# Patient Record
Sex: FEMALE | Race: WHITE | Employment: UNEMPLOYED | URBAN - METROPOLITAN AREA
[De-identification: names, ages, dates, MRNs, and addresses within clinical notes are randomized per-mention and may not be internally consistent; named-entity substitution may affect disease eponyms.]

---

## 2020-07-06 ENCOUNTER — APPOINTMENT (OUTPATIENT)
Dept: CT IMAGING | Age: 85
End: 2020-07-06
Payer: MEDICARE

## 2020-07-06 ENCOUNTER — APPOINTMENT (OUTPATIENT)
Dept: GENERAL RADIOLOGY | Age: 85
End: 2020-07-06
Payer: MEDICARE

## 2020-07-06 ENCOUNTER — HOSPITAL ENCOUNTER (INPATIENT)
Age: 85
LOS: 3 days | Discharge: HOME HEALTH | DRG: 481 | End: 2020-07-09
Attending: INTERNAL MEDICINE | Admitting: INTERNAL MEDICINE
Payer: MEDICARE

## 2020-07-06 ENCOUNTER — HOSPITAL ENCOUNTER (EMERGENCY)
Age: 85
Discharge: ANOTHER HOSPITAL | End: 2020-07-06
Attending: EMERGENCY MEDICINE
Payer: MEDICARE

## 2020-07-06 VITALS
OXYGEN SATURATION: 95 % | RESPIRATION RATE: 18 BRPM | DIASTOLIC BLOOD PRESSURE: 63 MMHG | BODY MASS INDEX: 22.36 KG/M2 | SYSTOLIC BLOOD PRESSURE: 150 MMHG | HEIGHT: 64 IN | WEIGHT: 131 LBS | HEART RATE: 73 BPM | TEMPERATURE: 98.8 F

## 2020-07-06 PROBLEM — I25.10 CAD (CORONARY ARTERY DISEASE): Status: ACTIVE | Noted: 2020-07-06

## 2020-07-06 PROBLEM — I10 HTN (HYPERTENSION): Status: ACTIVE | Noted: 2020-07-06

## 2020-07-06 PROBLEM — S72.002A CLOSED LEFT HIP FRACTURE, INITIAL ENCOUNTER (HCC): Status: ACTIVE | Noted: 2020-07-06

## 2020-07-06 LAB
ANION GAP SERPL CALCULATED.3IONS-SCNC: 10 MMOL/L (ref 7–16)
APTT: 34 SEC (ref 24.5–35.1)
BACTERIA: ABNORMAL /HPF
BASOPHILS ABSOLUTE: 0.03 E9/L (ref 0–0.2)
BASOPHILS RELATIVE PERCENT: 0.3 % (ref 0–2)
BILIRUBIN URINE: NEGATIVE
BLOOD, URINE: ABNORMAL
BUN BLDV-MCNC: 14 MG/DL (ref 8–23)
CALCIUM SERPL-MCNC: 9 MG/DL (ref 8.6–10.2)
CHLORIDE BLD-SCNC: 97 MMOL/L (ref 98–107)
CLARITY: CLEAR
CO2: 27 MMOL/L (ref 22–29)
COLOR: YELLOW
CREAT SERPL-MCNC: 1 MG/DL (ref 0.5–1)
EOSINOPHILS ABSOLUTE: 0.1 E9/L (ref 0.05–0.5)
EOSINOPHILS RELATIVE PERCENT: 1 % (ref 0–6)
EPITHELIAL CELLS, UA: ABNORMAL /HPF
GFR AFRICAN AMERICAN: >60
GFR NON-AFRICAN AMERICAN: 51 ML/MIN/1.73
GLUCOSE BLD-MCNC: 115 MG/DL (ref 74–99)
GLUCOSE URINE: NEGATIVE MG/DL
HCT VFR BLD CALC: 34.8 % (ref 34–48)
HEMOGLOBIN: 11.7 G/DL (ref 11.5–15.5)
IMMATURE GRANULOCYTES #: 0.04 E9/L
IMMATURE GRANULOCYTES %: 0.4 % (ref 0–5)
INR BLD: 1.2
KETONES, URINE: NEGATIVE MG/DL
LEUKOCYTE ESTERASE, URINE: NEGATIVE
LYMPHOCYTES ABSOLUTE: 1.31 E9/L (ref 1.5–4)
LYMPHOCYTES RELATIVE PERCENT: 13.5 % (ref 20–42)
MCH RBC QN AUTO: 31.2 PG (ref 26–35)
MCHC RBC AUTO-ENTMCNC: 33.6 % (ref 32–34.5)
MCV RBC AUTO: 92.8 FL (ref 80–99.9)
MONOCYTES ABSOLUTE: 0.82 E9/L (ref 0.1–0.95)
MONOCYTES RELATIVE PERCENT: 8.5 % (ref 2–12)
NEUTROPHILS ABSOLUTE: 7.39 E9/L (ref 1.8–7.3)
NEUTROPHILS RELATIVE PERCENT: 76.3 % (ref 43–80)
NITRITE, URINE: NEGATIVE
PDW BLD-RTO: 14.2 FL (ref 11.5–15)
PH UA: 6 (ref 5–9)
PLATELET # BLD: 259 E9/L (ref 130–450)
PMV BLD AUTO: 8.9 FL (ref 7–12)
POTASSIUM REFLEX MAGNESIUM: 3.6 MMOL/L (ref 3.5–5)
PROTEIN UA: 30 MG/DL
PROTHROMBIN TIME: 12.9 SEC (ref 9.3–12.4)
RBC # BLD: 3.75 E12/L (ref 3.5–5.5)
RBC UA: ABNORMAL /HPF (ref 0–2)
SODIUM BLD-SCNC: 134 MMOL/L (ref 132–146)
SPECIFIC GRAVITY UA: 1.02 (ref 1–1.03)
UROBILINOGEN, URINE: 1 E.U./DL
WBC # BLD: 9.7 E9/L (ref 4.5–11.5)
WBC UA: ABNORMAL /HPF (ref 0–5)

## 2020-07-06 PROCEDURE — 85610 PROTHROMBIN TIME: CPT

## 2020-07-06 PROCEDURE — 85730 THROMBOPLASTIN TIME PARTIAL: CPT

## 2020-07-06 PROCEDURE — 71045 X-RAY EXAM CHEST 1 VIEW: CPT

## 2020-07-06 PROCEDURE — APPSS45 APP SPLIT SHARED TIME 31-45 MINUTES: Performed by: NURSE PRACTITIONER

## 2020-07-06 PROCEDURE — 99222 1ST HOSP IP/OBS MODERATE 55: CPT | Performed by: INTERNAL MEDICINE

## 2020-07-06 PROCEDURE — 2580000003 HC RX 258: Performed by: NURSE PRACTITIONER

## 2020-07-06 PROCEDURE — 85025 COMPLETE CBC W/AUTO DIFF WBC: CPT

## 2020-07-06 PROCEDURE — 93005 ELECTROCARDIOGRAM TRACING: CPT | Performed by: NURSE PRACTITIONER

## 2020-07-06 PROCEDURE — 72192 CT PELVIS W/O DYE: CPT

## 2020-07-06 PROCEDURE — 81001 URINALYSIS AUTO W/SCOPE: CPT

## 2020-07-06 PROCEDURE — 6360000002 HC RX W HCPCS: Performed by: NURSE PRACTITIONER

## 2020-07-06 PROCEDURE — 6360000002 HC RX W HCPCS: Performed by: EMERGENCY MEDICINE

## 2020-07-06 PROCEDURE — 99285 EMERGENCY DEPT VISIT HI MDM: CPT

## 2020-07-06 PROCEDURE — 36415 COLL VENOUS BLD VENIPUNCTURE: CPT

## 2020-07-06 PROCEDURE — 2060000000 HC ICU INTERMEDIATE R&B

## 2020-07-06 PROCEDURE — 96374 THER/PROPH/DIAG INJ IV PUSH: CPT

## 2020-07-06 PROCEDURE — 70450 CT HEAD/BRAIN W/O DYE: CPT

## 2020-07-06 PROCEDURE — 80048 BASIC METABOLIC PNL TOTAL CA: CPT

## 2020-07-06 PROCEDURE — 6370000000 HC RX 637 (ALT 250 FOR IP): Performed by: NURSE PRACTITIONER

## 2020-07-06 PROCEDURE — 73502 X-RAY EXAM HIP UNI 2-3 VIEWS: CPT

## 2020-07-06 PROCEDURE — 72125 CT NECK SPINE W/O DYE: CPT

## 2020-07-06 RX ORDER — HYDROCODONE BITARTRATE AND ACETAMINOPHEN 5; 325 MG/1; MG/1
1 TABLET ORAL EVERY 6 HOURS PRN
Status: DISCONTINUED | OUTPATIENT
Start: 2020-07-06 | End: 2020-07-07

## 2020-07-06 RX ORDER — CHOLECALCIFEROL (VITAMIN D3) 1250 MCG
CAPSULE ORAL
COMMUNITY

## 2020-07-06 RX ORDER — MORPHINE SULFATE 2 MG/ML
2 INJECTION, SOLUTION INTRAMUSCULAR; INTRAVENOUS ONCE
Status: COMPLETED | OUTPATIENT
Start: 2020-07-06 | End: 2020-07-06

## 2020-07-06 RX ORDER — ENALAPRIL MALEATE 10 MG/1
10 TABLET ORAL DAILY
Status: DISCONTINUED | OUTPATIENT
Start: 2020-07-07 | End: 2020-07-07

## 2020-07-06 RX ORDER — POLYETHYLENE GLYCOL 3350 17 G/17G
17 POWDER, FOR SOLUTION ORAL DAILY PRN
Status: DISCONTINUED | OUTPATIENT
Start: 2020-07-06 | End: 2020-07-07

## 2020-07-06 RX ORDER — CLONIDINE HYDROCHLORIDE 0.2 MG/1
0.2 TABLET ORAL 2 TIMES DAILY
COMMUNITY

## 2020-07-06 RX ORDER — CARVEDILOL 25 MG/1
25 TABLET ORAL 2 TIMES DAILY WITH MEALS
Status: DISCONTINUED | OUTPATIENT
Start: 2020-07-06 | End: 2020-07-07

## 2020-07-06 RX ORDER — ONDANSETRON 2 MG/ML
4 INJECTION INTRAMUSCULAR; INTRAVENOUS EVERY 6 HOURS PRN
Status: DISCONTINUED | OUTPATIENT
Start: 2020-07-06 | End: 2020-07-07

## 2020-07-06 RX ORDER — ACETAMINOPHEN 650 MG/1
650 SUPPOSITORY RECTAL EVERY 6 HOURS PRN
Status: DISCONTINUED | OUTPATIENT
Start: 2020-07-06 | End: 2020-07-07

## 2020-07-06 RX ORDER — CITALOPRAM 20 MG/1
20 TABLET ORAL NIGHTLY
COMMUNITY

## 2020-07-06 RX ORDER — CLONIDINE HYDROCHLORIDE 0.2 MG/1
0.2 TABLET ORAL 2 TIMES DAILY
Status: DISCONTINUED | OUTPATIENT
Start: 2020-07-06 | End: 2020-07-07

## 2020-07-06 RX ORDER — AMLODIPINE BESYLATE 5 MG/1
5 TABLET ORAL NIGHTLY
COMMUNITY

## 2020-07-06 RX ORDER — CARVEDILOL 25 MG/1
25 TABLET ORAL 2 TIMES DAILY WITH MEALS
COMMUNITY

## 2020-07-06 RX ORDER — ACETAMINOPHEN 325 MG/1
650 TABLET ORAL EVERY 6 HOURS PRN
Status: DISCONTINUED | OUTPATIENT
Start: 2020-07-06 | End: 2020-07-07

## 2020-07-06 RX ORDER — ENALAPRIL MALEATE 10 MG/1
10 TABLET ORAL DAILY
COMMUNITY

## 2020-07-06 RX ORDER — SODIUM CHLORIDE 0.9 % (FLUSH) 0.9 %
10 SYRINGE (ML) INJECTION EVERY 12 HOURS SCHEDULED
Status: DISCONTINUED | OUTPATIENT
Start: 2020-07-06 | End: 2020-07-07

## 2020-07-06 RX ORDER — SODIUM CHLORIDE 0.9 % (FLUSH) 0.9 %
10 SYRINGE (ML) INJECTION PRN
Status: DISCONTINUED | OUTPATIENT
Start: 2020-07-06 | End: 2020-07-07

## 2020-07-06 RX ORDER — CITALOPRAM 20 MG/1
20 TABLET ORAL NIGHTLY
Status: DISCONTINUED | OUTPATIENT
Start: 2020-07-06 | End: 2020-07-07

## 2020-07-06 RX ORDER — PROMETHAZINE HYDROCHLORIDE 25 MG/1
12.5 TABLET ORAL EVERY 6 HOURS PRN
Status: DISCONTINUED | OUTPATIENT
Start: 2020-07-06 | End: 2020-07-07

## 2020-07-06 RX ORDER — AMLODIPINE BESYLATE 5 MG/1
5 TABLET ORAL NIGHTLY
Status: DISCONTINUED | OUTPATIENT
Start: 2020-07-06 | End: 2020-07-07

## 2020-07-06 RX ADMIN — CITALOPRAM HYDROBROMIDE 20 MG: 20 TABLET ORAL at 20:37

## 2020-07-06 RX ADMIN — SODIUM CHLORIDE, PRESERVATIVE FREE 10 ML: 5 INJECTION INTRAVENOUS at 20:37

## 2020-07-06 RX ADMIN — MORPHINE SULFATE 2 MG: 2 INJECTION, SOLUTION INTRAMUSCULAR; INTRAVENOUS at 11:53

## 2020-07-06 RX ADMIN — CLONIDINE HYDROCHLORIDE 0.2 MG: 0.2 TABLET ORAL at 20:37

## 2020-07-06 RX ADMIN — CARVEDILOL 25 MG: 25 TABLET, FILM COATED ORAL at 17:42

## 2020-07-06 RX ADMIN — AMLODIPINE BESYLATE 5 MG: 5 TABLET ORAL at 20:37

## 2020-07-06 ASSESSMENT — PAIN SCALES - GENERAL
PAINLEVEL_OUTOF10: 8
PAINLEVEL_OUTOF10: 0
PAINLEVEL_OUTOF10: 8

## 2020-07-06 ASSESSMENT — PAIN DESCRIPTION - ORIENTATION: ORIENTATION: LEFT

## 2020-07-06 ASSESSMENT — PAIN DESCRIPTION - LOCATION: LOCATION: HIP

## 2020-07-06 ASSESSMENT — PAIN DESCRIPTION - DESCRIPTORS: DESCRIPTORS: ACHING;SHARP

## 2020-07-06 NOTE — H&P
[facility]   History and Physical      CHIEF COMPLAINT:  Fall while at home     History of Present Illness: Shukri Echavarria is a 80 y.o. female with a history of CAD and HTN, who presents with fall while at home. Patient states that she was trying to walk to the bathroom when her house slipper got stuck on the rug and she fell. Patient states that she turned to walk, her body durned but her foot did not move. Patient states that she fell and hit her head but did not lose consciousness. Patient states that this happened yesterday. Patient is accompanied by her daughter who stated patient is an DNR but awaiting papers to verify. Patient denies any tobacco, alcohol or illicit drug use. Patient lives at home with . She rates her pain level at 5/10. Informant(s) for H&P:Provided by patient     REVIEW OF SYSTEMS:  no fevers, chills, cp, sob, n/v, ha, vision/hearing changes, wt changes, hot/cold flashes, other open skin lesions, diarrhea, constipation, dysuria/hematuria unless noted in HPI. Complete ROS performed with the patient and is otherwise negative. PMH:  Past Medical History:   Diagnosis Date    CAD (coronary artery disease)     Hypertension        Surgical History:  Past Surgical History:   Procedure Laterality Date    CHOLECYSTECTOMY      FRACTURE SURGERY         Medications Prior to Admission:    Prior to Admission medications    Medication Sig Start Date End Date Taking?  Authorizing Provider   citalopram (CELEXA) 20 MG tablet Take 20 mg by mouth nightly    Yes Historical Provider, MD   amLODIPine (NORVASC) 5 MG tablet Take 5 mg by mouth nightly    Yes Historical Provider, MD   enalapril (VASOTEC) 10 MG tablet Take 10 mg by mouth daily   Yes Historical Provider, MD   carvedilol (COREG) 25 MG tablet Take 25 mg by mouth 2 times daily (with meals)   Yes Historical Provider, MD   cloNIDine (CATAPRES) 0.2 MG tablet Take 0.2 mg by mouth 2 times daily   Yes Historical Provider, MD   Cholecalciferol (VITAMIN D3) 1.25 MG (87151 UT) CAPS Take by mouth   Yes Historical Provider, MD       Allergies:    Patient has no known allergies. Social History:    reports that she has quit smoking. She has never used smokeless tobacco. She reports previous alcohol use. She reports that she does not use drugs. Family History:    History reviewed. No pertinent family history. PHYSICAL EXAM:  Vitals:  BP (!) 158/67   Pulse 72   Temp 99 °F (37.2 °C) (Oral)   Resp 18   SpO2 90%   General Appearance: alert and oriented to person, place and time, well-developed and well-nourished, in no acute distress, intermittently forgetful  Skin: warm and dry  Head: normocephalic and atraumatic  Eyes: pupils equal, round, and reactive to light and conjunctivae normal  ENT: hearing grossly normal bilaterally  Neck: neck supple and non tender without mass   Pulmonary/Chest: clear to auscultation bilaterally- no wheezes, rales or rhonchi, normal air movement, no respiratory distress  Cardiovascular: normal rate, regular rhythm and intact distal pulses  Abdomen: soft, non-tender, non-distended, normal bowel sounds, no masses or organomegaly  Extremities: no cyanosis, no clubbing and no edema  Musculoskeletal: joint tenderness present  left hip and pain elicited with movement of  left leg  Neurologic: no cranial nerve deficit and speech normal      LABS:  Recent Labs     07/06/20  1149      K 3.6   CL 97*   CO2 27   BUN 14   CREATININE 1.0   GLUCOSE 115*   CALCIUM 9.0       Recent Labs     07/06/20  1149   WBC 9.7   RBC 3.75   HGB 11.7   HCT 34.8   MCV 92.8   MCH 31.2   MCHC 33.6   RDW 14.2      MPV 8.9       No results for input(s): POCGLU in the last 72 hours.     PT/INR:    Lab Results   Component Value Date    PROTIME 12.9 07/06/2020    INR 1.2 07/06/2020     U/A:    Lab Results   Component Value Date    COLORU YELLOW 05/31/2011    PROTEINU NEGATIVE 05/31/2011    PHUR 6.0 05/31/2011    WBCUA 2-5 2011    RBCUA 1-3 2011    BACTERIA NONE 2011    CLARITYU CLEAR 2011    SPECGRAV 1.010 2011    LEUKOCYTESUR SMALL 2011    UROBILINOGEN 0.2 2011    BILIRUBINUR NEGATIVE 2011    BLOODU SMALL 2011    GLUCOSEU NEGATIVE 2011       Radiology: Ct Head Wo Contrast    Result Date: 2020  Patient MRN: [id number] : 1921 Age:  80 years Gender: Female Order Date: 2020 9:30 AM Exam: CT HEAD WO CONTRAST Number of Images: 559 views Indication:   fall with acute head trauma fall Comparison: None. TECHNIQUE: Region of study: Head. CT images acquired without intravenous contrast. One or more of these dose optimization techniques were utilized: Automated exposure control; mA and/or kV adjustment per patient size (includes targeted exams where dose is matched to clinical indication); or iterative reconstruction. FINDINGS: No mass, hemorrhage or midline shift. There is atrophy and mild probable chronic microvascular ischemic disease. Bony calvarium unremarkable. Atrophy and mild probable chronic microvascular ischemic disease. Ct Cervical Spine Wo Contrast    Result Date: 2020  Patient MRN:  [id number] : 1921 Age: 80 years Gender: Female Order Date:  2020 9:30 AM EXAM: CT CERVICAL SPINE WO CONTRAST NUMBER OF IMAGES:  457 INDICATION:  fall fall COMPARISON: None Multiple CT sections were obtained with sagittal and coronal MPR reconstructions. Technique: Low-dose CT  acquisition technique included one of following options; 1 . Automated exposure control, 2. Adjustment of MA and or KV according to patient's size or 3. Use of iterative reconstruction. There is significant motion identified on this study. There are bilateral thyroid masses seen There are severe degenerative changes of the spine present. There are severe degenerative changes of the facets.  .    Degenerative changes Significantly limited study secondary to motion Bilateral thyroid masses    Ct Pelvis Wo Contrast Additional Contrast? None    Result Date: 2020  Patient MRN:  [id number] : 1921 Age: 80 years Gender: Female Order Date:  2020 9:30 AM EXAM: CT PELVIS WO CONTRAST INDICATION:  fall, left hip pain. TECHNIQUE: Axial images from above the iliac crest to the pubic symphysis without any IV or oral contrast. Axial, sagittal, and coronal 2-D reconstructions in soft tissue and bone windows. Dose reduction technique with automated exposure control. Contrast is none. Dose is total .10 MG Y CM. COMPARISON: Report only from right hip x-ray 10/28/2003, images not available on PACS. FINDINGS:  There is an acute impacted subcapital left femoral neck fracture. Impaction along the lateral margin. A slight valgus angulation. The femoral head is showing slight clockwise rotation within the acetabulum, no dislocation. Fracture does not extend into the intertrochanteric region or proximal femoral shaft. There is a small hip joint effusion. Underlying degenerative changes. Small subchondral cysts along the posterior superior femoral head margin. Mild narrowing of the superior-lateral hip joint. Mild superior and posterior acetabular margin degeneration, with tiny subchondral cysts and mild sclerosis. No acetabular fracture. The pubic rami are intact. No evidence for sacral fracture. Mild bilateral sacroiliac joint degeneration. 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 The cement beyond the tip of the right femoral stem is not fully included. No obvious new right femoral fracture and no dislocation. Chronic fragmentation of the greater trochanter. Chronic large area of myositis and spurring off the posterior acetabular margin extending to the right greater trochanter. Degenerative changes in the visualized lumbar spine. At L3/4, there is disc space narrowing, vacuum degeneration and mild spurring, not fully included.  At L4/5, there is severe flattening of the disc space with moderate vacuum degeneration, mild marginal spurring and tiny subchondral cysts. Moderate facet degeneration, mild disc bulge and a moderate central stenosis. Mild neural foraminal stenosis. At L5/S1, there is severe flattening of the disc space with mild vacuum degeneration. Moderate right lateral spurs. Mild facet degeneration. Mild left neural foraminal stenosis. Moderate right and left anterior lateral spurs L5/S1. No intraperitoneal free fluid or pelvic hematomas. Urinary bladder was very distended at the time of exam without wall thickening or calculi. Small uterus and ovaries. Mild constipation without obstruction. Minimal sigmoid diverticulosis. Moderate calcination at the aortic bifurcation. Moderate stenosis proximal right common iliac artery. .     1. Acute impacted subcapital left femoral neck fracture without dislocation. 2. 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 3. Lumbar degeneration. Xr Chest Portable    Result Date: 2020  Patient MRN: [id number] : 1921 Age:  80 years Gender: Female Order Date: 2020 9:30 AM Exam: XR CHEST PORTABLE Number of Images: 1 view Indication:   fall fall Comparison: 2008 Findings: The heart is enlarged. The lung fields demonstrate evidence for airspace disease. The aorta is tortuous and ectatic. Tortuous ectatic aorta Cardiomegaly Airspace disease compatible with pneumonia, in the left upper lobe. Neoplasm could give this appearance. Xr Hip 2-3 Vw W Pelvis Left    Result Date: 2020  Patient MRN:  [id number] : 1921 Age: 80 years Gender: Female Order Date:  2020 1:15 PM EXAM: XR HIP 2-3 VW W PELVIS LEFT INDICATION:  fall, hip fx COMPARISON: CT pelvis 2020. FINDINGS:  3 views. AP pelvis. AP and frog-leg lateral view left hip. Again demonstrated is an acute impacted subcapital left femoral neck fracture. Impaction along the lateral fracture margin. Lateral rotation of the left femoral head without dislocation. Osteopenia.  No fracture of the intertrochanteric region or proximal left femoral shaft. Old right hip prosthesis. Old large area of myositis ossificans lateral right hip area within adjacent large spur off the inferior right iliac bone. This could significantly reduced motion at the right hip. Mild spurring off the lateral right iliac bone. Sequelae joints and pubic symphysis are normal. No obvious deformity of the pubic rami. Degenerative narrowing and mild spurring in the lower lumbar spine. Mild constipation. Urinary bladder is mildly distended. 1. Acute impacted subcapital left femoral neck fracture. 2. No dislocation. 3. Old right hip prosthesis. EKG: pending     ASSESSMENT:      Principal Problem:    Closed left hip fracture, initial encounter ([facility] [address]  Active Problems:    HTN (hypertension)    CAD (coronary artery disease)  Resolved Problems:    * No resolved hospital problems. *      PLAN:    1. Closed left Hip Fracture  - secondary to fall - admit telemetry - consult orthopedic surgery - IVF's - Norco for pain   2. Hypertension - blood pressure controlled with Norvasc and Catapres  3. Coronary Artery Disease - continue Coreg and Vasotec   4. Fall - while at home - PT/OT consult when okay with orthopedic surgeon    5. ? Pneumonia - diminished lungs asymptomatic - no cough or fever   6. Incidental finding Bilateral Thyroid Mass   7. Degenerative Bone Disease - most likely d/t normal age process     Code Status: DNR CC needs clarified   DVT prophylaxis: Lovenox       Electronically signed by JANICE Mcgovern CNP on 7/6/2020 at 4:26 PM      NOTE: This report was transcribed using voice recognition software. Every effort was made to ensure accuracy; however, inadvertent computerized transcription errors may be present.

## 2020-07-06 NOTE — CONSULTS
Consult Note    Chief complaint: Pain left hip    History: Patient is a 80-year-old female, who lives in her own home by herself. She states that she has been ambulatory using a walker within her home for about the last 6 months. Yesterday she went to go to the bathroom around 630 to 7 PM.  She was wearing slippers on her feet. She tripped over the slippers somehow and fell against the door to the bathroom. She slid down to the floor. She had pain in the left groin and trouble bearing weight so she got herself up to a chair and called for help with her family. She came to the emergency room at the [address] [place] [address] emergency room. There CT scan and x-rays revealed a valgus impacted left femoral neck fracture. The patient's daughter-in-law states that while they are preparing to come to the hospital, the patient was able to stand up from a chair and take a few steps although she did have some pain with doing that. Past medical history: Hypertension, history of right femoral neck fracture for which she had a right hip hemiarthroplasty some years ago, done by me. Medications: Vasotec, Norvasc, Coreg, Celexa, Catapres    Exam: Very pleasant 80-year-old female in no apparent distress. She is awake, alert and oriented x3. She is an excellent historian. Her daughter-in-law is in the room with her. Patient able to actively flex left hip with some discomfort in the left groin . Minimal discomfort with passive internal and external rotation left hip. No significant leg length discrepancy. Full range of motion left ankle and toes. Sensation intact to light touch first dorsal webspace, dorsal and plantar aspect of left foot. Foot is warm to touch. CT scan and x-ray: Valgus impacted left femoral neck fracture. Impression: Valgus impacted left femoral neck fracture.     Plan: Given the position of this fracture is being relatively stable, believe that we should treat this via open reduction, internal fixation with 3 cannulated screws. I explained to the patient and her daughter-in-law what that means. Our goal will be to have this fracture held in place by the 3 cannulated screws and allow her to get up and hopefully maintain 50% weightbearing. If there should be failure of fixation and collapse of the femoral head then our next move would be to consider partial hip replacement. 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 I discussed risks of surgery which include risk of infection, blood loss, damage to nerves, arteries and veins, DVT/PE and death. Both the patient and the daughter-in-law state that they understand the risks involved and are ready to proceed to surgery. Surgery is currently planned for approximately 5 to 5:30 PM tomorrow, 7/7/2020. The patient can have regular diet now up until midnight. After midnight until 7 AM she can have clear liquids, then n.p.o. after 7 AM.  We will proceed with surgery if the patient is cleared by medical service.     Electronically signed by Eli Pandya MD on 7/6/2020 at 6:08 PM

## 2020-07-06 NOTE — ED PROVIDER NOTES
HPI:  7/6/20, Time: 9:21 AM EDT         Angel Guthrie is a 80 y.o. female presenting to the ED for fall yesterday. Patient states that she slipped on the rug when she was going to use the bathroom causing her to fall. She struck her head. She did not lose consciousness. She was able to ambulate after the incident. She reports pain to her left hip which is worse with movement and walking today. She denies headache, neck pain, back pain, chest pain, abdominal pain, nausea, vomiting, and focal numbness or weakness. She takes no anticoagulation. She lives at home. Review of Systems:   Pertinent positives and negatives are stated within HPI, all other systems reviewed and are negative.          --------------------------------------------- PAST HISTORY ---------------------------------------------  Past Medical History:  has a past medical history of CAD (coronary artery disease) and Hypertension. Past Surgical History:  has a past surgical history that includes fracture surgery and Cholecystectomy. Social History:  reports that she has quit smoking. She has never used smokeless tobacco. She reports previous alcohol use. She reports that she does not use drugs. Family History: family history is not on file. The patients home medications have been reviewed. Allergies: Patient has no known allergies.     -------------------------------------------------- RESULTS -------------------------------------------------  All laboratory and radiology results have been personally reviewed by myself   LABS:  Results for orders placed or performed during the hospital encounter of 07/06/20   CBC Auto Differential   Result Value Ref Range    WBC 9.7 4.5 - 11.5 E9/L    RBC 3.75 3.50 - 5.50 E12/L    Hemoglobin 11.7 11.5 - 15.5 g/dL    Hematocrit 34.8 34.0 - 48.0 %    MCV 92.8 80.0 - 99.9 fL    MCH 31.2 26.0 - 35.0 pg    MCHC 33.6 32.0 - 34.5 %    RDW 14.2 11.5 - 15.0 fL    Platelets 915 624 - 393 E9/L    MPV 8.9 7.0 - 12.0 fL    Neutrophils % 76.3 43.0 - 80.0 %    Immature Granulocytes % 0.4 0.0 - 5.0 %    Lymphocytes % 13.5 (L) 20.0 - 42.0 %    Monocytes % 8.5 2.0 - 12.0 %    Eosinophils % 1.0 0.0 - 6.0 %    Basophils % 0.3 0.0 - 2.0 %    Neutrophils Absolute 7.39 (H) 1.80 - 7.30 E9/L    Immature Granulocytes # 0.04 E9/L    Lymphocytes Absolute 1.31 (L) 1.50 - 4.00 E9/L    Monocytes Absolute 0.82 0.10 - 0.95 E9/L    Eosinophils Absolute 0.10 0.05 - 0.50 E9/L    Basophils Absolute 0.03 0.00 - 0.20 A8/C   Basic Metabolic Panel w/ Reflex to MG   Result Value Ref Range    Sodium 134 132 - 146 mmol/L    Potassium reflex Magnesium 3.6 3.5 - 5.0 mmol/L    Chloride 97 (L) 98 - 107 mmol/L    CO2 27 22 - 29 mmol/L    Anion Gap 10 7 - 16 mmol/L    Glucose 115 (H) 74 - 99 mg/dL    BUN 14 8 - 23 mg/dL    CREATININE 1.0 0.5 - 1.0 mg/dL    GFR Non-African American 51 >=60 mL/min/1.73    GFR African American >60     Calcium 9.0 8.6 - 10.2 mg/dL   Protime-INR   Result Value Ref Range    Protime 12.9 (H) 9.3 - 12.4 sec    INR 1.2    APTT   Result Value Ref Range    aPTT 34.0 24.5 - 35.1 sec       RADIOLOGY:  Interpreted by Radiologist.  XR HIP 2-3 VW W PELVIS LEFT   Final Result      1. Acute impacted subcapital left femoral neck fracture. 2. No dislocation. 3. Old right hip prosthesis. CT PELVIS WO CONTRAST Additional Contrast? None   Final Result      1. Acute impacted subcapital left femoral neck fracture without   dislocation. 2. 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 3. Lumbar degeneration. XR CHEST PORTABLE   Final Result   Tortuous ectatic aorta   Cardiomegaly   Airspace disease compatible with pneumonia, in the left upper lobe. Neoplasm could give this appearance. CT Head WO Contrast   Final Result   Atrophy and mild probable chronic microvascular ischemic disease.       CT Cervical Spine WO Contrast   Final Result   Degenerative changes   Significantly limited study secondary to motion   Bilateral thyroid masses          ------------------------- NURSING NOTES AND VITALS REVIEWED ---------------------------   The nursing notes within the ED encounter and vital signs as below have been reviewed. BP (!) 150/63   Pulse 73   Temp 98.8 °F (37.1 °C) (Oral)   Resp 18   Ht 5' 4\" (1.626 m)   Wt 131 lb (59.4 kg)   SpO2 95%   BMI 22.49 kg/m²   Oxygen Saturation Interpretation: Normal      ---------------------------------------------------PHYSICAL EXAM--------------------------------------      PHYSICAL EXAM:  Vitals Reviewed  Constitutional/General: Alert and oriented x3, well appearing, non toxic in NAD. HEENT: Normocephalic and atraumatic. PERRL, EOMI. Oropharynx clear, handling secretions, no trismus. No hemotympanum. No raccoon eyes. No hickey's sign. Neck: Supple, full ROM, no cervical spine tenderness. Pulmonary: Lungs clear to auscultation bilaterally, no wheezes, rales, or rhonchi. Not in respiratory distress. Cardiovascular:  Regular rate and rhythm, no murmurs, gallops, or rubs. 2+ distal pulses. Chest: No chest wall tenderness. No crepitus. Abdomen: Soft, non tender, non distended,   Extremities: Moves all extremities x 4. Warm and well perfused. Tenderness to left groin and hip, normal range of motion, distal pulses intact. Back: No midline thoracic or lumbar tenderness. Skin: warm and dry without rash. Neurologic: GCS 15, 5/5 strength in all extremities. Normal sensation in all extremities. Psych: Normal Affect.      ------------------------------ ED COURSE/MEDICAL DECISION MAKING----------------------  Medications   morphine (PF) injection 2 mg (2 mg Intravenous Given 7/6/20 1153)       Medical Decision Making/ED COURSE:   Patient is a 60-year-old female presenting after mechanical fall yesterday with left hip pain. She was hemodynamically stable and nontoxic on evaluation.   Head CT, cervical spine CT, chest x-ray, and pelvic CT were ordered to

## 2020-07-06 NOTE — LETTER
Beneficiary Notification Letter  BPCI Advanced     Your Doctor or [address],    We wanted to let you know that your health care provider, [address] [place] [address] Renea, has volunteered to take part in our [facility] for [facility] & Medicaid Services (CMS) Bundled Payments for [facility] (BPCI Advanced). This doesnt change your Medicare rights or benefits and you dont need to do anything. What are bundled payments? A bundled payment combines, or bundles together, payments that Medicare makes to your health care providers for the many different kinds of medical services you might get in a specific time period. In BPCI Advanced, this time period could include a hospital inpatient stay or outpatient procedure, plus 90 days. Why would Medicare bundle payments? Bundled payments are thought of as a value-based way to pay because health care providers are responsible for both the quality and cost of medical care they give. This is a relatively new way of paying health care providers compared to thefee-for-service way Medicare has traditionally paid, where providers are paid separately for each service they provide. Bundled payments encourage these providers to work together to provide better, more coordinated care during your hospital stay, or outpatient procedure, and through your recovery. What does BPCI Advance mean for me? Youre more likely to get even better care when hospitals, doctors, and other health care providers work together. In BPCI Advanced, hospitals, doctors, and other health care providers may be rewarded for providing better, more coordinated health care. Medicare will watch BPCI Advanced participants closely to make sure that you and other patients keep getting efficient, high quality care. What do I need to know about BPCI Advanced? Whats most important for you to know is that your Medicare rights and benefits wont change because your health care provider is participating in [address] [place]. Medicare will keep covering all of your medically necessary services. Even though Medicare will pay your doctor in a different way under BPCI Advanced, how much you have to pay wont change. Health care providers and suppliers who are enrolled in Medicare will submit their Medicare claims like they always have. Youll have all the same Medicare rights and protections, including the right to choose which hospital, doctor, or other health care provider you see. If you dont want to get care from a health care provider whos participating in [address] [place], then youll have to choose a different health care provider whos not participating in the Model. How can I give feedback about my health care? Medicare might ask you to take a voluntary survey about the services and care you received from [address] [place] [address] during your hospital stay or outpatient procedure and for a specific period of time afterwards. You can decide whether you want to take the voluntary survey, but if you do, itll help Medicare make BPCI Advanced and the care of other Medicare patients better. If you have concerns or complaints about your care, you can:   · Talk to your doctor or health care provider. · Contact your Beneficiary and Family Centered Care Quality Improvement   Organization JORGE LUIS GRANADOS [facility]). You can get your BFCC-QIOs phone number  at  Medicare.gov/contacts or by calling 1-800-MEDICARE. TTY users can call  3-844.446.8133. Where can I learn more about BPCI Advanced? Learn more about BPCI Advanced at https://innovation.cms.gov/initiatives/bpci-advanced/:  · A list of all the hospitals and physician group practices in the country participating in [address] [place]. · All of the inpatient and outpatient Clinical Episodes that are currently included under BPCI Advanced. A Clinical Episode is a grouping of medical conditions or diagnoses that are included in the [address] Health system.

## 2020-07-07 ENCOUNTER — ANESTHESIA EVENT (OUTPATIENT)
Dept: OPERATING ROOM | Age: 85
DRG: 481 | End: 2020-07-07
Payer: MEDICARE

## 2020-07-07 ENCOUNTER — APPOINTMENT (OUTPATIENT)
Dept: GENERAL RADIOLOGY | Age: 85
DRG: 481 | End: 2020-07-07
Attending: INTERNAL MEDICINE
Payer: MEDICARE

## 2020-07-07 ENCOUNTER — ANESTHESIA (OUTPATIENT)
Dept: OPERATING ROOM | Age: 85
DRG: 481 | End: 2020-07-07
Payer: MEDICARE

## 2020-07-07 VITALS
RESPIRATION RATE: 14 BRPM | SYSTOLIC BLOOD PRESSURE: 128 MMHG | DIASTOLIC BLOOD PRESSURE: 53 MMHG | TEMPERATURE: 98.4 F | OXYGEN SATURATION: 76 %

## 2020-07-07 LAB
ABO/RH: NORMAL
ALBUMIN SERPL-MCNC: 3.4 G/DL (ref 3.5–5.2)
ALP BLD-CCNC: 74 U/L (ref 35–104)
ALT SERPL-CCNC: 29 U/L (ref 0–32)
ANION GAP SERPL CALCULATED.3IONS-SCNC: 11 MMOL/L (ref 7–16)
ANTIBODY SCREEN: NORMAL
AST SERPL-CCNC: 31 U/L (ref 0–31)
BASOPHILS ABSOLUTE: 0.03 E9/L (ref 0–0.2)
BASOPHILS RELATIVE PERCENT: 0.4 % (ref 0–2)
BILIRUB SERPL-MCNC: 0.7 MG/DL (ref 0–1.2)
BUN BLDV-MCNC: 14 MG/DL (ref 8–23)
CALCIUM SERPL-MCNC: 9 MG/DL (ref 8.6–10.2)
CHLORIDE BLD-SCNC: 95 MMOL/L (ref 98–107)
CO2: 28 MMOL/L (ref 22–29)
CREAT SERPL-MCNC: 0.9 MG/DL (ref 0.5–1)
EOSINOPHILS ABSOLUTE: 0.16 E9/L (ref 0.05–0.5)
EOSINOPHILS RELATIVE PERCENT: 2 % (ref 0–6)
GFR AFRICAN AMERICAN: >60
GFR NON-AFRICAN AMERICAN: 58 ML/MIN/1.73
GLUCOSE BLD-MCNC: 101 MG/DL (ref 74–99)
HCT VFR BLD CALC: 32.6 % (ref 34–48)
HEMOGLOBIN: 10.8 G/DL (ref 11.5–15.5)
IMMATURE GRANULOCYTES #: 0.03 E9/L
IMMATURE GRANULOCYTES %: 0.4 % (ref 0–5)
LV EF: 65 %
LVEF MODALITY: NORMAL
LYMPHOCYTES ABSOLUTE: 1.39 E9/L (ref 1.5–4)
LYMPHOCYTES RELATIVE PERCENT: 17 % (ref 20–42)
MAGNESIUM: 2.2 MG/DL (ref 1.6–2.6)
MCH RBC QN AUTO: 30.3 PG (ref 26–35)
MCHC RBC AUTO-ENTMCNC: 33.1 % (ref 32–34.5)
MCV RBC AUTO: 91.6 FL (ref 80–99.9)
MONOCYTES ABSOLUTE: 1.01 E9/L (ref 0.1–0.95)
MONOCYTES RELATIVE PERCENT: 12.3 % (ref 2–12)
NEUTROPHILS ABSOLUTE: 5.57 E9/L (ref 1.8–7.3)
NEUTROPHILS RELATIVE PERCENT: 67.9 % (ref 43–80)
PDW BLD-RTO: 14.1 FL (ref 11.5–15)
PHOSPHORUS: 2.8 MG/DL (ref 2.5–4.5)
PLATELET # BLD: 229 E9/L (ref 130–450)
PMV BLD AUTO: 8.8 FL (ref 7–12)
POTASSIUM REFLEX MAGNESIUM: 3.9 MMOL/L (ref 3.5–5)
RBC # BLD: 3.56 E12/L (ref 3.5–5.5)
SODIUM BLD-SCNC: 134 MMOL/L (ref 132–146)
TOTAL PROTEIN: 7.3 G/DL (ref 6.4–8.3)
WBC # BLD: 8.2 E9/L (ref 4.5–11.5)

## 2020-07-07 PROCEDURE — 7100000000 HC PACU RECOVERY - FIRST 15 MIN: Performed by: ORTHOPAEDIC SURGERY

## 2020-07-07 PROCEDURE — 6360000002 HC RX W HCPCS: Performed by: ORTHOPAEDIC SURGERY

## 2020-07-07 PROCEDURE — 3600000003 HC SURGERY LEVEL 3 BASE: Performed by: ORTHOPAEDIC SURGERY

## 2020-07-07 PROCEDURE — 2500000003 HC RX 250 WO HCPCS: Performed by: NURSE ANESTHETIST, CERTIFIED REGISTERED

## 2020-07-07 PROCEDURE — 86850 RBC ANTIBODY SCREEN: CPT

## 2020-07-07 PROCEDURE — APPSS30 APP SPLIT SHARED TIME 16-30 MINUTES: Performed by: NURSE PRACTITIONER

## 2020-07-07 PROCEDURE — 2700000000 HC OXYGEN THERAPY PER DAY

## 2020-07-07 PROCEDURE — 6360000002 HC RX W HCPCS: Performed by: NURSE ANESTHETIST, CERTIFIED REGISTERED

## 2020-07-07 PROCEDURE — 1200000000 HC SEMI PRIVATE

## 2020-07-07 PROCEDURE — 2580000003 HC RX 258: Performed by: ORTHOPAEDIC SURGERY

## 2020-07-07 PROCEDURE — 6370000000 HC RX 637 (ALT 250 FOR IP): Performed by: ORTHOPAEDIC SURGERY

## 2020-07-07 PROCEDURE — C1769 GUIDE WIRE: HCPCS | Performed by: ORTHOPAEDIC SURGERY

## 2020-07-07 PROCEDURE — 86900 BLOOD TYPING SEROLOGIC ABO: CPT

## 2020-07-07 PROCEDURE — 2709999900 HC NON-CHARGEABLE SUPPLY: Performed by: ORTHOPAEDIC SURGERY

## 2020-07-07 PROCEDURE — 87081 CULTURE SCREEN ONLY: CPT

## 2020-07-07 PROCEDURE — APPSS60 APP SPLIT SHARED TIME 46-60 MINUTES: Performed by: PHYSICIAN ASSISTANT

## 2020-07-07 PROCEDURE — 2580000003 HC RX 258: Performed by: NURSE ANESTHETIST, CERTIFIED REGISTERED

## 2020-07-07 PROCEDURE — 93306 TTE W/DOPPLER COMPLETE: CPT

## 2020-07-07 PROCEDURE — 2500000003 HC RX 250 WO HCPCS: Performed by: ORTHOPAEDIC SURGERY

## 2020-07-07 PROCEDURE — 3700000000 HC ANESTHESIA ATTENDED CARE: Performed by: ORTHOPAEDIC SURGERY

## 2020-07-07 PROCEDURE — 83735 ASSAY OF MAGNESIUM: CPT

## 2020-07-07 PROCEDURE — 6370000000 HC RX 637 (ALT 250 FOR IP): Performed by: NURSE PRACTITIONER

## 2020-07-07 PROCEDURE — 3700000001 HC ADD 15 MINUTES (ANESTHESIA): Performed by: ORTHOPAEDIC SURGERY

## 2020-07-07 PROCEDURE — 36415 COLL VENOUS BLD VENIPUNCTURE: CPT

## 2020-07-07 PROCEDURE — 2580000003 HC RX 258: Performed by: NURSE PRACTITIONER

## 2020-07-07 PROCEDURE — 0QS704Z REPOSITION LEFT UPPER FEMUR WITH INTERNAL FIXATION DEVICE, OPEN APPROACH: ICD-10-PCS | Performed by: ORTHOPAEDIC SURGERY

## 2020-07-07 PROCEDURE — 80053 COMPREHEN METABOLIC PANEL: CPT

## 2020-07-07 PROCEDURE — 99232 SBSQ HOSP IP/OBS MODERATE 35: CPT | Performed by: INTERNAL MEDICINE

## 2020-07-07 PROCEDURE — 3209999900 FLUORO FOR SURGICAL PROCEDURES

## 2020-07-07 PROCEDURE — 85025 COMPLETE CBC W/AUTO DIFF WBC: CPT

## 2020-07-07 PROCEDURE — 3600000013 HC SURGERY LEVEL 3 ADDTL 15MIN: Performed by: ORTHOPAEDIC SURGERY

## 2020-07-07 PROCEDURE — 84100 ASSAY OF PHOSPHORUS: CPT

## 2020-07-07 PROCEDURE — C1713 ANCHOR/SCREW BN/BN,TIS/BN: HCPCS | Performed by: ORTHOPAEDIC SURGERY

## 2020-07-07 PROCEDURE — 86901 BLOOD TYPING SEROLOGIC RH(D): CPT

## 2020-07-07 PROCEDURE — 7100000001 HC PACU RECOVERY - ADDTL 15 MIN: Performed by: ORTHOPAEDIC SURGERY

## 2020-07-07 PROCEDURE — 99223 1ST HOSP IP/OBS HIGH 75: CPT | Performed by: INTERNAL MEDICINE

## 2020-07-07 DEVICE — SCREW BNE L85MM DIA7.3MM THRD L16MM CANC S STL SELF DRL ST: Type: IMPLANTABLE DEVICE | Status: FUNCTIONAL

## 2020-07-07 DEVICE — SCREW BNE L90MM DIA7.3MM THRD L16MM CANC S STL SELF DRL ST: Type: IMPLANTABLE DEVICE | Status: FUNCTIONAL

## 2020-07-07 RX ORDER — ONDANSETRON 2 MG/ML
INJECTION INTRAMUSCULAR; INTRAVENOUS PRN
Status: DISCONTINUED | OUTPATIENT
Start: 2020-07-07 | End: 2020-07-07 | Stop reason: SDUPTHER

## 2020-07-07 RX ORDER — FENTANYL CITRATE 50 UG/ML
25 INJECTION, SOLUTION INTRAMUSCULAR; INTRAVENOUS EVERY 5 MIN PRN
Status: DISCONTINUED | OUTPATIENT
Start: 2020-07-07 | End: 2020-07-07

## 2020-07-07 RX ORDER — MEPERIDINE HYDROCHLORIDE 25 MG/ML
12.5 INJECTION INTRAMUSCULAR; INTRAVENOUS; SUBCUTANEOUS EVERY 5 MIN PRN
Status: DISCONTINUED | OUTPATIENT
Start: 2020-07-07 | End: 2020-07-07

## 2020-07-07 RX ORDER — FENTANYL CITRATE 50 UG/ML
50 INJECTION, SOLUTION INTRAMUSCULAR; INTRAVENOUS EVERY 5 MIN PRN
Status: DISCONTINUED | OUTPATIENT
Start: 2020-07-07 | End: 2020-07-07

## 2020-07-07 RX ORDER — ASPIRIN 81 MG/1
162 TABLET ORAL DAILY
Status: DISCONTINUED | OUTPATIENT
Start: 2020-07-07 | End: 2020-07-09 | Stop reason: HOSPADM

## 2020-07-07 RX ORDER — PROPOFOL 10 MG/ML
INJECTION, EMULSION INTRAVENOUS PRN
Status: DISCONTINUED | OUTPATIENT
Start: 2020-07-07 | End: 2020-07-07 | Stop reason: SDUPTHER

## 2020-07-07 RX ORDER — LIDOCAINE HYDROCHLORIDE 20 MG/ML
INJECTION, SOLUTION EPIDURAL; INFILTRATION; INTRACAUDAL; PERINEURAL PRN
Status: DISCONTINUED | OUTPATIENT
Start: 2020-07-07 | End: 2020-07-07 | Stop reason: SDUPTHER

## 2020-07-07 RX ORDER — ONDANSETRON 2 MG/ML
4 INJECTION INTRAMUSCULAR; INTRAVENOUS EVERY 6 HOURS PRN
Status: DISCONTINUED | OUTPATIENT
Start: 2020-07-07 | End: 2020-07-09 | Stop reason: HOSPADM

## 2020-07-07 RX ORDER — SODIUM CHLORIDE 0.9 % (FLUSH) 0.9 %
10 SYRINGE (ML) INJECTION PRN
Status: DISCONTINUED | OUTPATIENT
Start: 2020-07-07 | End: 2020-07-09 | Stop reason: HOSPADM

## 2020-07-07 RX ORDER — SODIUM CHLORIDE 9 MG/ML
INJECTION, SOLUTION INTRAVENOUS CONTINUOUS
Status: DISCONTINUED | OUTPATIENT
Start: 2020-07-07 | End: 2020-07-09 | Stop reason: HOSPADM

## 2020-07-07 RX ORDER — SODIUM CHLORIDE, SODIUM LACTATE, POTASSIUM CHLORIDE, CALCIUM CHLORIDE 600; 310; 30; 20 MG/100ML; MG/100ML; MG/100ML; MG/100ML
INJECTION, SOLUTION INTRAVENOUS CONTINUOUS PRN
Status: DISCONTINUED | OUTPATIENT
Start: 2020-07-07 | End: 2020-07-07 | Stop reason: SDUPTHER

## 2020-07-07 RX ORDER — OXYCODONE HYDROCHLORIDE AND ACETAMINOPHEN 5; 325 MG/1; MG/1
1 TABLET ORAL
Status: DISCONTINUED | OUTPATIENT
Start: 2020-07-07 | End: 2020-07-07

## 2020-07-07 RX ORDER — TRAMADOL HYDROCHLORIDE 50 MG/1
50 TABLET ORAL EVERY 6 HOURS PRN
Status: DISCONTINUED | OUTPATIENT
Start: 2020-07-07 | End: 2020-07-09 | Stop reason: HOSPADM

## 2020-07-07 RX ORDER — BUPIVACAINE HYDROCHLORIDE 5 MG/ML
INJECTION, SOLUTION EPIDURAL; INTRACAUDAL PRN
Status: DISCONTINUED | OUTPATIENT
Start: 2020-07-07 | End: 2020-07-07 | Stop reason: ALTCHOICE

## 2020-07-07 RX ORDER — SENNA AND DOCUSATE SODIUM 50; 8.6 MG/1; MG/1
1 TABLET, FILM COATED ORAL 2 TIMES DAILY
Status: DISCONTINUED | OUTPATIENT
Start: 2020-07-07 | End: 2020-07-09 | Stop reason: HOSPADM

## 2020-07-07 RX ORDER — ACETAMINOPHEN 325 MG/1
650 TABLET ORAL EVERY 6 HOURS PRN
Status: DISCONTINUED | OUTPATIENT
Start: 2020-07-07 | End: 2020-07-09 | Stop reason: HOSPADM

## 2020-07-07 RX ORDER — PROMETHAZINE HYDROCHLORIDE 25 MG/ML
6.25 INJECTION, SOLUTION INTRAMUSCULAR; INTRAVENOUS
Status: DISCONTINUED | OUTPATIENT
Start: 2020-07-07 | End: 2020-07-07

## 2020-07-07 RX ORDER — ROCURONIUM BROMIDE 10 MG/ML
INJECTION, SOLUTION INTRAVENOUS PRN
Status: DISCONTINUED | OUTPATIENT
Start: 2020-07-07 | End: 2020-07-07 | Stop reason: SDUPTHER

## 2020-07-07 RX ORDER — FENTANYL CITRATE 50 UG/ML
INJECTION, SOLUTION INTRAMUSCULAR; INTRAVENOUS PRN
Status: DISCONTINUED | OUTPATIENT
Start: 2020-07-07 | End: 2020-07-07 | Stop reason: SDUPTHER

## 2020-07-07 RX ORDER — PROMETHAZINE HYDROCHLORIDE 25 MG/1
12.5 TABLET ORAL EVERY 6 HOURS PRN
Status: DISCONTINUED | OUTPATIENT
Start: 2020-07-07 | End: 2020-07-09 | Stop reason: HOSPADM

## 2020-07-07 RX ORDER — SODIUM CHLORIDE 0.9 % (FLUSH) 0.9 %
10 SYRINGE (ML) INJECTION EVERY 12 HOURS SCHEDULED
Status: DISCONTINUED | OUTPATIENT
Start: 2020-07-07 | End: 2020-07-09 | Stop reason: HOSPADM

## 2020-07-07 RX ORDER — DIPHENHYDRAMINE HYDROCHLORIDE 50 MG/ML
12.5 INJECTION INTRAMUSCULAR; INTRAVENOUS
Status: DISCONTINUED | OUTPATIENT
Start: 2020-07-07 | End: 2020-07-07

## 2020-07-07 RX ADMIN — FENTANYL CITRATE 50 MCG: 50 INJECTION, SOLUTION INTRAMUSCULAR; INTRAVENOUS at 17:02

## 2020-07-07 RX ADMIN — LIDOCAINE HYDROCHLORIDE 60 MG: 20 INJECTION, SOLUTION EPIDURAL; INFILTRATION; INTRACAUDAL; PERINEURAL at 16:39

## 2020-07-07 RX ADMIN — PROPOFOL 100 MG: 10 INJECTION, EMULSION INTRAVENOUS at 16:39

## 2020-07-07 RX ADMIN — SUGAMMADEX 200 MG: 100 INJECTION, SOLUTION INTRAVENOUS at 17:58

## 2020-07-07 RX ADMIN — ROCURONIUM BROMIDE 30 MG: 10 INJECTION, SOLUTION INTRAVENOUS at 16:39

## 2020-07-07 RX ADMIN — DOCUSATE SODIUM 50 MG AND SENNOSIDES 8.6 MG 1 TABLET: 8.6; 5 TABLET, FILM COATED ORAL at 21:00

## 2020-07-07 RX ADMIN — CARVEDILOL 25 MG: 25 TABLET, FILM COATED ORAL at 10:06

## 2020-07-07 RX ADMIN — CEFAZOLIN 1 G: 1 INJECTION, POWDER, FOR SOLUTION INTRAMUSCULAR; INTRAVENOUS at 16:57

## 2020-07-07 RX ADMIN — SODIUM CHLORIDE: 9 INJECTION, SOLUTION INTRAVENOUS at 20:19

## 2020-07-07 RX ADMIN — SODIUM CHLORIDE, POTASSIUM CHLORIDE, SODIUM LACTATE AND CALCIUM CHLORIDE: 600; 310; 30; 20 INJECTION, SOLUTION INTRAVENOUS at 16:30

## 2020-07-07 RX ADMIN — TRAMADOL HYDROCHLORIDE 50 MG: 50 TABLET, FILM COATED ORAL at 22:43

## 2020-07-07 RX ADMIN — CLONIDINE HYDROCHLORIDE 0.2 MG: 0.2 TABLET ORAL at 14:35

## 2020-07-07 RX ADMIN — SODIUM CHLORIDE, PRESERVATIVE FREE 10 ML: 5 INJECTION INTRAVENOUS at 10:06

## 2020-07-07 RX ADMIN — FENTANYL CITRATE 50 MCG: 50 INJECTION, SOLUTION INTRAMUSCULAR; INTRAVENOUS at 16:39

## 2020-07-07 RX ADMIN — PROPOFOL 30 MG: 10 INJECTION, EMULSION INTRAVENOUS at 17:13

## 2020-07-07 RX ADMIN — ASPIRIN 162 MG: 81 TABLET, COATED ORAL at 22:43

## 2020-07-07 RX ADMIN — SUGAMMADEX 200 MG: 100 INJECTION, SOLUTION INTRAVENOUS at 17:41

## 2020-07-07 RX ADMIN — ONDANSETRON HYDROCHLORIDE 4 MG: 2 INJECTION, SOLUTION INTRAMUSCULAR; INTRAVENOUS at 16:58

## 2020-07-07 ASSESSMENT — PULMONARY FUNCTION TESTS
PIF_VALUE: 40
PIF_VALUE: 22
PIF_VALUE: 24
PIF_VALUE: 19
PIF_VALUE: 5
PIF_VALUE: 19
PIF_VALUE: 17
PIF_VALUE: 19
PIF_VALUE: 19
PIF_VALUE: 18
PIF_VALUE: 2
PIF_VALUE: 18
PIF_VALUE: 17
PIF_VALUE: 2
PIF_VALUE: 20
PIF_VALUE: 18
PIF_VALUE: 23
PIF_VALUE: 17
PIF_VALUE: 17
PIF_VALUE: 0
PIF_VALUE: 5
PIF_VALUE: 22
PIF_VALUE: 17
PIF_VALUE: 18
PIF_VALUE: 19
PIF_VALUE: 23
PIF_VALUE: 17
PIF_VALUE: 18
PIF_VALUE: 17
PIF_VALUE: 18
PIF_VALUE: 7
PIF_VALUE: 18
PIF_VALUE: 18
PIF_VALUE: 20
PIF_VALUE: 18
PIF_VALUE: 19
PIF_VALUE: 19
PIF_VALUE: 18
PIF_VALUE: 20
PIF_VALUE: 18
PIF_VALUE: 18
PIF_VALUE: 1
PIF_VALUE: 17
PIF_VALUE: 3
PIF_VALUE: 21
PIF_VALUE: 17
PIF_VALUE: 17
PIF_VALUE: 18
PIF_VALUE: 25
PIF_VALUE: 4
PIF_VALUE: 23
PIF_VALUE: 26
PIF_VALUE: 24
PIF_VALUE: 19
PIF_VALUE: 19
PIF_VALUE: 18
PIF_VALUE: 24
PIF_VALUE: 24
PIF_VALUE: 4
PIF_VALUE: 17
PIF_VALUE: 18
PIF_VALUE: 0
PIF_VALUE: 18
PIF_VALUE: 6
PIF_VALUE: 6
PIF_VALUE: 17
PIF_VALUE: 24
PIF_VALUE: 0
PIF_VALUE: 18
PIF_VALUE: 3
PIF_VALUE: 17
PIF_VALUE: 25
PIF_VALUE: 23
PIF_VALUE: 17
PIF_VALUE: 17
PIF_VALUE: 18
PIF_VALUE: 17
PIF_VALUE: 18
PIF_VALUE: 22
PIF_VALUE: 17
PIF_VALUE: 1
PIF_VALUE: 18
PIF_VALUE: 19
PIF_VALUE: 18
PIF_VALUE: 17
PIF_VALUE: 18

## 2020-07-07 ASSESSMENT — PAIN - FUNCTIONAL ASSESSMENT
PAIN_FUNCTIONAL_ASSESSMENT: ACTIVITIES ARE NOT PREVENTED
PAIN_FUNCTIONAL_ASSESSMENT: ACTIVITIES ARE NOT PREVENTED
PAIN_FUNCTIONAL_ASSESSMENT: PREVENTS OR INTERFERES SOME ACTIVE ACTIVITIES AND ADLS

## 2020-07-07 ASSESSMENT — PAIN DESCRIPTION - PROGRESSION
CLINICAL_PROGRESSION: RAPIDLY IMPROVING
CLINICAL_PROGRESSION: GRADUALLY WORSENING
CLINICAL_PROGRESSION: RESOLVED

## 2020-07-07 ASSESSMENT — PAIN SCALES - GENERAL
PAINLEVEL_OUTOF10: 0
PAINLEVEL_OUTOF10: 4
PAINLEVEL_OUTOF10: 0

## 2020-07-07 ASSESSMENT — PAIN DESCRIPTION - FREQUENCY: FREQUENCY: INTERMITTENT

## 2020-07-07 ASSESSMENT — PAIN DESCRIPTION - ORIENTATION: ORIENTATION: LEFT

## 2020-07-07 ASSESSMENT — PAIN DESCRIPTION - ONSET: ONSET: ON-GOING

## 2020-07-07 ASSESSMENT — PAIN DESCRIPTION - PAIN TYPE: TYPE: SURGICAL PAIN

## 2020-07-07 ASSESSMENT — PAIN DESCRIPTION - LOCATION: LOCATION: HIP

## 2020-07-07 ASSESSMENT — PAIN DESCRIPTION - DESCRIPTORS: DESCRIPTORS: DISCOMFORT;SORE;TENDER

## 2020-07-07 NOTE — CARE COORDINATION
7/7/2020  Social Work Discharge Planning: This worker met with Pt to discuss  role and transition of care/discharge planning. Pt was sleeping so SW called Pts daughter Marcia Saenz. Viv Richardson said Pt resides in her apartment with 6 steps, alone. Pt has 2 ww's and had been doing fine with the steps prior to hip fracture. Sw discussed ADRIENNE and choices. Viv Richardson informed that she and Pts son are probably going to take Pt home with them and care for her[id number] because of COVID going on. SW discussed that ADRIENNE may be better for PT;however, they can decide that after Pt eventually works with therapy. Pt is to have surgery today at 5pm. Sw put a ADRIENNE list in Pts room and informed Viv Richardson about the list. Pharmacy is [facility] in [facility]. Electronically signed by JAK Li on 7/7/2020 at 11:05 AM

## 2020-07-07 NOTE — PROGRESS NOTES
[facility] Hospitalist   Progress Note    Admitting Date and Time: 7/6/2020  3:12 PM  Admit Dx: Cl[facility] [S72.002A]    Subjective:    Patient was admitted with Closed left hip fracture, initial encounter [facility] Derl Frame. Patient is currently NPO for ortho surgery today. Patient denies any nausea or shortness of breath. ROS: denies fever, chills, cp, sob, n/v, HA unless stated above.      amLODIPine  5 mg Oral Nightly    carvedilol  25 mg Oral BID WC    citalopram  20 mg Oral Nightly    cloNIDine  0.2 mg Oral BID    enalapril  10 mg Oral Daily    sodium chloride flush  10 mL Intravenous 2 times per day     sodium chloride flush, 10 mL, PRN  acetaminophen, 650 mg, Q6H PRN    Or  acetaminophen, 650 mg, Q6H PRN  polyethylene glycol, 17 g, Daily PRN  promethazine, 12.5 mg, Q6H PRN    Or  ondansetron, 4 mg, Q6H PRN  HYDROcodone 5 mg - acetaminophen, 1 tablet, Q6H PRN         Objective:    BP (!) 153/57   Pulse 72   Temp 98 °F (36.7 °C) (Oral)   Resp 16   Ht 5' 4\" (1.626 m)   Wt 136 lb 4.8 oz (61.8 kg)   SpO2 94%   BMI 23.40 kg/m²   General Appearance: alert and oriented to person, place and time, well-developed and well nourished and in no acute distress but does have some forgetfulness   Skin: warm and dry  Head: normocephalic and atraumatic  Eyes: pupils equal, round, and reactive to light and conjunctivae normal  ENT: hearing abnormal- slightly hard of hearing   Neck: neck supple and non tender without mass   Pulmonary/Chest: decreased breath sounds noted- but CTA bilaterally  Cardiovascular: normal rate, regular rhythm and intact distal pulses  Abdomen: soft, non-tender, non-distended, normal bowel sounds, no masses or organomegaly  Extremities: no cyanosis, no clubbing and no edema  Musculoskeletal: no swollen joints, joint deformity present-  left hip and joint tenderness present  left leg  Neurologic: no cranial nerve deficit, speech normal and gait abnormal- unsteady       Recent Labs     07/06/20  1149 07/07/20  0407    134   K 3.6 3.9   CL 97* 95*   CO2 27 28   BUN 14 14   CREATININE 1.0 0.9   GLUCOSE 115* 101*   CALCIUM 9.0 9.0       Recent Labs     07/06/20  1149 07/07/20  0407   WBC 9.7 8.2   RBC 3.75 3.56   HGB 11.7 10.8*   HCT 34.8 32.6*   MCV 92.8 91.6   MCH 31.2 30.3   MCHC 33.6 33.1   RDW 14.2 14.1    229   MPV 8.9 8.8       Radiology:   No orders to display       Assessment:    Principal Problem:    Closed left hip fracture, initial encounter ([facility]  Active Problems:    HTN (hypertension)    CAD (coronary artery disease)  Resolved Problems:    * No resolved hospital problems. *      Plan:  1. Closed left Hip Fracture  - secondary to fall - admit telemetry - consult orthopedic surgery - IVF's - Norco for pain   2. Hypertension - blood pressure controlled with Norvasc and Catapres   3. Coronary Artery Disease - continue Coreg and Vasotec   4. Fall - while at home - PT/OT consult when okay with orthopedic surgeon    5. ? Pneumonia - diminished lungs asymptomatic - no cough or fever   6. Incidental finding Bilateral Thyroid Mass   7. Degenerative Bone Disease - most likely d/t normal age process   6.  Normocytic Anemia - H&H stable      Disposition - surgery today and post op providing no issues will go to orthopedic floor [address]     Electronically signed by JANICE Montoya - CNP on 7/7/2020 at 9:59 AM

## 2020-07-07 NOTE — PLAN OF CARE
Problem: Falls - Risk of:  Goal: Will remain free from falls  Description: Will remain free from falls  Outcome: Met This Shift     Problem: Falls - Risk of:  Goal: Absence of physical injury  Description: Absence of physical injury  Outcome: Met This Shift     Problem: Skin Integrity:  Goal: Will show no infection signs and symptoms  Description: Will show no infection signs and symptoms  Outcome: Met This Shift     Problem: Skin Integrity:  Goal: Absence of new skin breakdown  Description: Absence of new skin breakdown  Outcome: Met This Shift

## 2020-07-07 NOTE — CONSULTS
Inpatient Cardiology Consultation      Reason for Consult:  Preoperative Cardiac Risk Stratification     Consulting Physician: Dr Lilia Garcia     Requesting Physician:  Dr Marcia Jernigan    Date of Consultation: 7/7/2020    HISTORY OF PRESENT ILLNESS:   Ms Romo is a 79 yo  female who is not previously known to [facility]. She follows with Dr Roya Blackwood for [facility]. She has a PMh that includes hypertension, right CEA (dates unknown). She presented to [facility]  7/6/2020 8:57 after a fall at home. VS 98.8-73-18-95%RA-150/63. WBC 97, H&H 11.7/34.8, . Na 134, K+ 3.6, CL 97, CO2 27, BUN/Scr 14/1.0, Glucose 115. XR left hip with acute neck fracture   CT pelvis with acute neck fractue L hip  CXT tortuous ectatic aorta and cardiomegaly   CT head with mild chronic microvascular changes   CT cervical with degenerative changes, nothing acute. bilateral thyroid masses    She states that she was walking into the bathroom at home and turned her body to turn the lights on when she tripped over her slipper and fall. She remember this who event and states that she did not pass out. She denies any lightheadedness or dizziness, LOC. She states that she lost her balance form her slipper and made her fall. She denies any chest pain,palpitations, shortness of breath, orthopnea/PND. She was admitted after a mechanical fall or a L hip fracture with plans for ORIF this afternoon 7/7/2020. Cardiology was asked to see the patient for preoperative cardiac risk stratification. She states that she lives alone in her home with a walker for assistance when she needs it. Otherwise, she is able to perform all of her ADL without difficulty;ty. She denies chest pain or shortness of breath with exertion. She had never had a cardiac work up and does not see a Cardiologist. She is a no smoker. She takes medications for hypertension at home.        Please note: past medical noted bilaterally   Heart Ausculation- Regular rate and rhythm, +systolic murmur. PV: No lower extremity edema. No varicosities. ABDOMEN: Soft, non-tender to light palpation. Bowel sounds present. MS: Good muscle strength and tone. No atrophy or abnormal movements. : Deferred   SKIN: Warm and dry no statis dermatitis or ulcers   NEURO / PSYCH: Oriented to person, place and time. Speech clear and appropriate. Follows all commands. Pleasant affect     DATA:    ECG: SR HR 74   Tele strips:  Sinus rhythm   Diagnostic:      Intake/Output Summary (Last 24 hours) at 7/7/2020 0955  Last data filed at 7/7/2020 0510  Gross per 24 hour   Intake 360 ml   Output 900 ml   Net -540 ml       Labs:   CBC:   Recent Labs     07/06/20  1149 07/07/20  0407   WBC 9.7 8.2   HGB 11.7 10.8*   HCT 34.8 32.6*    229     BMP:   Recent Labs     07/06/20  1149 07/07/20  0407    134   K 3.6 3.9   CO2 27 28   BUN 14 14   CREATININE 1.0 0.9   LABGLOM 51 58   CALCIUM 9.0 9.0     Mag:   Recent Labs     07/07/20  0407   MG 2.2     Phos:   Recent Labs     07/07/20  0407   PHOS 2.8     PT/INR:   Recent Labs     07/06/20  1149   PROTIME 12.9*   INR 1.2     APTT:  Recent Labs     07/06/20  1149   APTT 34.0       LIVER PROFILE:  Recent Labs     07/07/20  0407   AST 31   ALT 29   LABALBU 3.4*     CT Pelvis 7/6/2020  1. Acute impacted subcapital left femoral neck fracture without  dislocation. 2. 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 3. Lumbar degeneration. Assessment:   1. Left subcapital left femoral neck fracture with plans for ORIF today 6/8/9586.   2. Systolic murmur   3. Hypertensnion, on Coreg, Amlodipine, and Enalapril at home. 4. RCRI Risk Score: Class I Risk, 0.4% risk of major Cardiac event. 5. Prior CEA    Plan:   1. Check Echocardiogram   2. Ok to proceed with surgery from Cardiology standpoint.   3. Telemetry reviewed (no new arrhythmias)     Case discussed with Dr Juvencio Haider, further recommendations to

## 2020-07-07 NOTE — ANESTHESIA PRE PROCEDURE
Department of Anesthesiology  Preprocedure Note       Name:  Chasity Murray   Age:  80 y.o.  :  1921                                          MRN:  [id number]         Date:  2020      Surgeon: Jessica Holley):  Luis Daniel Rodriguez MD    Procedure: Procedure(s):  LEFT HIP OPEN REDUCTION INTERNAL FIXATION 3 CANNULATED SCREWS +++SYNTHES+++ CANNULATED 7.3MM SCREWS FLUOROSCOPY AND FRACTURE TABLE    +++REQ. 5 PM+++    Medications prior to admission:   Prior to Admission medications    Medication Sig Start Date End Date Taking?  Authorizing Provider   citalopram (CELEXA) 20 MG tablet Take 20 mg by mouth nightly    Yes Historical Provider, MD   amLODIPine (NORVASC) 5 MG tablet Take 5 mg by mouth nightly    Yes Historical Provider, MD   enalapril (VASOTEC) 10 MG tablet Take 10 mg by mouth daily   Yes Historical Provider, MD   carvedilol (COREG) 25 MG tablet Take 25 mg by mouth 2 times daily (with meals)   Yes Historical Provider, MD   cloNIDine (CATAPRES) 0.2 MG tablet Take 0.2 mg by mouth 2 times daily   Yes Historical Provider, MD   Cholecalciferol (VITAMIN D3) 1.25 MG (84471 UT) CAPS Take by mouth   Yes Historical Provider, MD       Current medications:    Current Facility-Administered Medications   Medication Dose Route Frequency Provider Last Rate Last Dose    ceFAZolin (ANCEF) 1 g in sterile water 10 mL IV syringe  1 g Intravenous 30 Min Pre-Op Luis Daniel Rodriguez MD        amLODIPine (NORVASC) tablet 5 mg  5 mg Oral Nightly JANICE Arriaza - CNP   5 mg at 20    carvedilol (COREG) tablet 25 mg  25 mg Oral BID WC Coleen Hart APRN - CNP   25 mg at 20 1006    citalopram (CELEXA) tablet 20 mg  20 mg Oral Nightly Coleen Hart APRN - CNP   20 mg at 20    cloNIDine (CATAPRES) tablet 0.2 mg  0.2 mg Oral BID JANICE Arriaza - CNP   0.2 mg at 20 1435    enalapril (VASOTEC) tablet 10 mg  10 mg Oral Daily JANICE Arriaza CNP        sodium chloride flush 0.9 % injection 10 mL 10 mL Intravenous 2 times per day Janit Cables, APRN - CNP   10 mL at 07/07/20 1006    sodium chloride flush 0.9 % injection 10 mL  10 mL Intravenous PRN Janit Cables, APRN - CNP        acetaminophen (TYLENOL) tablet 650 mg  650 mg Oral Q6H PRN Janit Cables, APRN - CNP        Or    acetaminophen (TYLENOL) suppository 650 mg  650 mg Rectal Q6H PRN Janit Cables, APRN - CNP        polyethylene glycol (GLYCOLAX) packet 17 g  17 g Oral Daily PRN Janit Cables, APRN - CNP        promethazine (PHENERGAN) tablet 12.5 mg  12.5 mg Oral Q6H PRN Janit Cables, APRN - CNP        Or    ondansetron [facility]) injection 4 mg  4 mg Intravenous Q6H PRN Janit Cables, APRN - CNP        HYDROcodone-acetaminophen (Giovanni Ring) 5-325 MG per tablet 1 tablet  1 tablet Oral Q6H PRN Janit Cables, APRN - CNP           Allergies:  No Known Allergies    Problem List:    Patient Active Problem List   Diagnosis Code    [facility] S72.002A    HTN (hypertension) I10    CAD (coronary artery disease) I25.10       Past Medical History:        Diagnosis Date    CAD (coronary artery disease)     Hypertension        Past Surgical History:        Procedure Laterality Date    CHOLECYSTECTOMY      FRACTURE SURGERY         Social History:    Social History     Tobacco Use    Smoking status: Former Smoker    Smokeless tobacco: Never Used   Substance Use Topics    Alcohol use: Not Currently                                Counseling given: Not Answered      Vital Signs (Current):   Vitals:    07/07/20 0420 07/07/20 1002 07/07/20 1410 07/07/20 1614   BP:  (!) 139/52 (!) 166/60 (!) 153/69   Pulse:  71 70    Resp:  18     Temp:  99.3 °F (37.4 °C) 98.6 °F (37 °C)    TempSrc:  Oral Oral    SpO2:  94%     Weight: 136 lb 4.8 oz (61.8 kg)      Height:                                                  BP Readings from Last 3 Encounters:   07/07/20 (!) 153/69   07/06/20 (!) 150/63       NPO Status: Time of last liquid consumption: 1900                        Time of last solid consumption: 1900                        Date of last liquid consumption: 07/06/20                        Date of last solid food consumption: 07/06/20    BMI:   Wt Readings from Last 3 Encounters:   07/07/20 136 lb 4.8 oz (61.8 kg)   07/06/20 131 lb (59.4 kg)     Body mass index is 23.4 kg/m². CBC:   Lab Results   Component Value Date    WBC 8.2 07/07/2020    RBC 3.56 07/07/2020    HGB 10.8 07/07/2020    HCT 32.6 07/07/2020    MCV 91.6 07/07/2020    RDW 14.1 07/07/2020     07/07/2020       CMP:   Lab Results   Component Value Date     07/07/2020    K 3.9 07/07/2020    CL 95 07/07/2020    CO2 28 07/07/2020    BUN 14 07/07/2020    CREATININE 0.9 07/07/2020    GFRAA >60 07/07/2020    LABGLOM 58 07/07/2020    GLUCOSE 101 07/07/2020    GLUCOSE 76 05/31/2011    PROT 7.3 07/07/2020    CALCIUM 9.0 07/07/2020    BILITOT 0.7 07/07/2020    ALKPHOS 74 07/07/2020    AST 31 07/07/2020    ALT 29 07/07/2020       POC Tests: No results for input(s): POCGLU, POCNA, POCK, POCCL, POCBUN, POCHEMO, POCHCT in the last 72 hours.     Coags:   Lab Results   Component Value Date    PROTIME 12.9 07/06/2020    INR 1.2 07/06/2020    APTT 34.0 07/06/2020       HCG (If Applicable): No results found for: PREGTESTUR, PREGSERUM, HCG, HCGQUANT     ABGs: No results found for: PHART, PO2ART, CNJ0TAG, YUT4FUW, BEART, S6QKDAOG     Type & Screen (If Applicable):  No results found for: LABABO, LABRH    Drug/Infectious Status (If Applicable):  No results found for: HIV, HEPCAB    COVID-19 Screening (If Applicable): No results found for: COVID19      Anesthesia Evaluation  Patient summary reviewed no history of anesthetic complications:   Airway: Mallampati: III  TM distance: <3 FB     Mouth opening: > = 3 FB Dental:    (+) edentulous      Pulmonary: breath sounds clear to auscultation                            ROS comment: Ex smoker   Cardiovascular:    (+) hypertension:, CAD:, Rhythm: regular  Rate: normal                 ROS comment: Uses walker for ambulation     Neuro/Psych:   (+) psychiatric history:depression/anxiety             GI/Hepatic/Renal:             Endo/Other:                     Abdominal:           Vascular:                                        Anesthesia Plan      general     ASA 3       Induction: intravenous. Anesthetic plan and risks discussed with patient. Use of blood products discussed with patient whom consented to blood products. Plan discussed with CRNA.                   Jaquelin Marcos MD   7/7/2020

## 2020-07-07 NOTE — OP NOTE
She was transferred to [facility] for management. She is cleared medically for surgery and came to the OR on 7/7/2020. Technique: Patient in a supine position. Left lower extremity was placed in a traction boot underwent general anesthesia on her hospital bed then was transferred to the fracture table but no traction was necessary. The right hip was flexed at the hip and knee and placed on a well leg aranda, well-padded, out of the way of the fluoroscopy. The left lower extremity was placed in slight abduction and internal rotation. Fluoroscopic images were obtained to be sure we had good AP and lateral images. We then prepped and draped the area of the left hip in the usual sterile manner. We used the clear shower curtain surgical drape. With fluoroscopic assistance we placed 3 guidepins from the [employer] 7.3 mm diameter cannulated screw set through the lateral cortex of the femur, across the femoral neck and into the femoral head. Guidepins were placed through stab wounds laterally. The most inferior pin was placed centrally, with the 2 more proximal pins placed anteriorly and posteriorly, respectively, and an inverted triangle position. We made the best effort to have the pins relatively parallel in both AP and lateral planes. We made sure that all pin tips were within the femoral head on both views. We then measured for and inserted 3 cannulated screws with short threads. The most inferior screw was 90 mm in length, the other 2 pins were 85 mm in length. All were fully seated with good purchase. Guide pins were removed. Final images were obtained in both AP and lateral views. Surgical incisions were copiously irrigated with sterile saline. Each incision was closed with 2-0 Vicryl inverted suture and Steri-Strips were applied. Sterile dressing was applied with an occlusive island dressing. Patient was retrieved from the fracture table and moved back to her hospital bed.        The patient retrieved from general anesthesia and taken to the recovery room in good condition, having tolerated the procedure well. All needle sponge and instrument counts are correct. Implants: Synthes 7.3 mm cannulated screws, 90 mm x 1, 85 mm x 2.     Electronically signed by Ry Russell MD on 7/7/2020 at 5:56 PM

## 2020-07-07 NOTE — BRIEF OP NOTE
Brief Postoperative Note      Patient: Elizabeth Interiano  YOB: 1921  MRN: [id number]    Date of Procedure: 7/7/2020    Pre-Op Diagnosis: LEFT FEMORAL NECK FRACTURE    Post-Op Diagnosis: Same       Procedure(s):  LEFT HIP OPEN REDUCTION INTERNAL left femoral neck fracture using 3 cannulated screws, with fluoroscopic assistance    Surgeon(s):  Juan A Pérez MD    Assistant:  * No surgical staff found *    Anesthesia: General    Estimated Blood Loss (mL): Minimal    Complications: None    Specimens:   * No specimens in log *    Implants:  Implant Name Type Inv.  Item Serial No.  Lot No. LRB No. Used Action   SCREW CYNTHIA 16MM THRD SS [id number] Screw/Plate/Nail/Cheo SCREW CYNTHIA 16MM THRD SS 7.3X85MM  SYNTHES  Left 2 Implanted   SCREW CYN[employer]IA 16MM THRD SS 7.3X90MM Screw/Plate/Nail/Cheo SCREW CYNTHIA 16MM THRD SS 7.3X90MM  SYNTHES  Left 1 Implanted         Drains:   Urethral Catheter Non-latex;Straight-tip 16 fr (Active)   $ Urethral catheter insertion Inserted for procedure 7/6/2020  7:51 PM   Catheter Indications Perioperative use in selected surgeries including but not limited to urologic, pelvic or need for intraoperative monitoring of urinary output due to prolonged surgery, large volume infusion or need for diuretic therapy in surgery 7/6/2020  7:51 PM   Securement Device Date Changed 07/06/20 7/6/2020  7:51 PM   Site Assessment Moist;Pink 7/6/2020  7:51 PM   Urine Color Tonia 7/7/2020  3:27 PM   Urine Appearance Cloudy 7/7/2020  3:27 PM   Output (mL) 325 mL 7/7/2020  3:27 PM       Findings: Fracture    Electronically signed by Juan A Pérez MD on 7/7/2020 at 5:55 PM unknown

## 2020-07-08 LAB
EKG ATRIAL RATE: 74 BPM
EKG P AXIS: 49 DEGREES
EKG P-R INTERVAL: 160 MS
EKG Q-T INTERVAL: 398 MS
EKG QRS DURATION: 66 MS
EKG QTC CALCULATION (BAZETT): 441 MS
EKG R AXIS: 60 DEGREES
EKG T AXIS: 46 DEGREES
EKG VENTRICULAR RATE: 74 BPM
HCT VFR BLD CALC: 31.6 % (ref 34–48)
HEMOGLOBIN: 10.4 G/DL (ref 11.5–15.5)

## 2020-07-08 PROCEDURE — 6360000002 HC RX W HCPCS: Performed by: ORTHOPAEDIC SURGERY

## 2020-07-08 PROCEDURE — 85018 HEMOGLOBIN: CPT

## 2020-07-08 PROCEDURE — 99232 SBSQ HOSP IP/OBS MODERATE 35: CPT | Performed by: INTERNAL MEDICINE

## 2020-07-08 PROCEDURE — 2580000003 HC RX 258: Performed by: ORTHOPAEDIC SURGERY

## 2020-07-08 PROCEDURE — 6370000000 HC RX 637 (ALT 250 FOR IP): Performed by: ORTHOPAEDIC SURGERY

## 2020-07-08 PROCEDURE — 97165 OT EVAL LOW COMPLEX 30 MIN: CPT

## 2020-07-08 PROCEDURE — APPSS30 APP SPLIT SHARED TIME 16-30 MINUTES: Performed by: NURSE PRACTITIONER

## 2020-07-08 PROCEDURE — 97161 PT EVAL LOW COMPLEX 20 MIN: CPT

## 2020-07-08 PROCEDURE — 97530 THERAPEUTIC ACTIVITIES: CPT

## 2020-07-08 PROCEDURE — 2700000000 HC OXYGEN THERAPY PER DAY

## 2020-07-08 PROCEDURE — 85014 HEMATOCRIT: CPT

## 2020-07-08 PROCEDURE — 1200000000 HC SEMI PRIVATE

## 2020-07-08 PROCEDURE — 93010 ELECTROCARDIOGRAM REPORT: CPT | Performed by: INTERNAL MEDICINE

## 2020-07-08 PROCEDURE — 36415 COLL VENOUS BLD VENIPUNCTURE: CPT

## 2020-07-08 PROCEDURE — 6370000000 HC RX 637 (ALT 250 FOR IP): Performed by: INTERNAL MEDICINE

## 2020-07-08 RX ORDER — CARVEDILOL 25 MG/1
25 TABLET ORAL 2 TIMES DAILY
Status: DISCONTINUED | OUTPATIENT
Start: 2020-07-08 | End: 2020-07-09 | Stop reason: HOSPADM

## 2020-07-08 RX ADMIN — DOCUSATE SODIUM 50 MG AND SENNOSIDES 8.6 MG 1 TABLET: 8.6; 5 TABLET, FILM COATED ORAL at 08:43

## 2020-07-08 RX ADMIN — DOCUSATE SODIUM 50 MG AND SENNOSIDES 8.6 MG 1 TABLET: 8.6; 5 TABLET, FILM COATED ORAL at 22:28

## 2020-07-08 RX ADMIN — ASPIRIN 162 MG: 81 TABLET, COATED ORAL at 08:47

## 2020-07-08 RX ADMIN — Medication 10 ML: at 22:28

## 2020-07-08 RX ADMIN — ACETAMINOPHEN 650 MG: 325 TABLET ORAL at 08:44

## 2020-07-08 RX ADMIN — Medication 10 ML: at 08:44

## 2020-07-08 RX ADMIN — WATER 1 G: 1 INJECTION INTRAMUSCULAR; INTRAVENOUS; SUBCUTANEOUS at 01:17

## 2020-07-08 RX ADMIN — TRAMADOL HYDROCHLORIDE 50 MG: 50 TABLET, FILM COATED ORAL at 06:09

## 2020-07-08 RX ADMIN — WATER 1 G: 1 INJECTION INTRAMUSCULAR; INTRAVENOUS; SUBCUTANEOUS at 08:44

## 2020-07-08 RX ADMIN — CARVEDILOL 25 MG: 25 TABLET, FILM COATED ORAL at 22:28

## 2020-07-08 ASSESSMENT — PAIN DESCRIPTION - PAIN TYPE: TYPE: SURGICAL PAIN

## 2020-07-08 ASSESSMENT — PAIN DESCRIPTION - PROGRESSION
CLINICAL_PROGRESSION: RAPIDLY IMPROVING
CLINICAL_PROGRESSION: GRADUALLY WORSENING

## 2020-07-08 ASSESSMENT — PAIN DESCRIPTION - ORIENTATION: ORIENTATION: LEFT

## 2020-07-08 ASSESSMENT — PAIN DESCRIPTION - FREQUENCY: FREQUENCY: INTERMITTENT

## 2020-07-08 ASSESSMENT — PAIN DESCRIPTION - DESCRIPTORS: DESCRIPTORS: ACHING;DISCOMFORT;TENDER

## 2020-07-08 ASSESSMENT — PAIN SCALES - GENERAL
PAINLEVEL_OUTOF10: 7
PAINLEVEL_OUTOF10: 3
PAINLEVEL_OUTOF10: 0

## 2020-07-08 ASSESSMENT — PAIN - FUNCTIONAL ASSESSMENT
PAIN_FUNCTIONAL_ASSESSMENT: PREVENTS OR INTERFERES SOME ACTIVE ACTIVITIES AND ADLS
PAIN_FUNCTIONAL_ASSESSMENT: ACTIVITIES ARE NOT PREVENTED

## 2020-07-08 ASSESSMENT — PAIN DESCRIPTION - LOCATION: LOCATION: HIP

## 2020-07-08 ASSESSMENT — PAIN DESCRIPTION - ONSET: ONSET: ON-GOING

## 2020-07-08 NOTE — PROGRESS NOTES
Physical Therapy    Facility/Department: [facility] SURGERY  Initial Assessment    NAME: Jose Luis Ramirez  : 1921  MRN: [id number]    Date of Service: 2020     REQUIRES PT FOLLOW UP: Yes       Patient Diagnosis(es): There were no encounter diagnoses. has a past medical history of CAD (coronary artery disease) and Hypertension. has a past surgical history that includes fracture surgery; Cholecystectomy; and Hip fracture surgery (Left, 2020). Evaluating Therapist: Chandra Cruz PT     Referring Provider:  Kelechi Lora MD    Room #: 367   DIAGNOSIS:  Closed L hip fx s/p ORIF 2020  PRECAUTIONS: falls, L LE PWB, O2 @ 2 LNC     Social:  Pt lives alone  in a  1 floor apartment, 6-7  steps and  1  rails to enter. Prior to admission pt walked with  ww      Initial Evaluation  Date:  2020 Treatment      Short Term/ Long Term   Goals   Was pt agreeable to Eval/treatment? yes      Does pt have pain?  minimal L hip pain      Bed Mobility  Rolling:  NT   Supine to sit:  Min assist   Sit to supine:  NT   Scooting:  Min assist    SBA    Transfers Sit to stand:  Min assist from chair, mod assist from bed   Stand to sit: mod assist   Stand pivot: Mod assist    SBA    Ambulation    35  feet with  ww  with  L LE PWB ( pt unable to maintain ) min assist     feet with ww  with L LE PWB        Stair negotiation: ascended and descended NT    4  steps with  1  rail with  [employer] assist    LE ROM  Grossly WFL      LE strength  L hip 3- to 3/ 5      AM- PAC RAW score   15/ 24            Pt is alert and Oriented x  3      Balance:  Min assist   Endurance: decreased   Bed/Chair alarm: Yes      ASSESSMENT  Pt displays functional ability as noted in the objective portion of this evaluation. Treatment/Education:     Mobility as above. Pt unable to maintain L LE PWB without assist.   Upon arrival, O2 was not in pt's nose. Pulse ox 86%. NC placed in nose , [email or web address] LNC and pulse ox 90%.  With O2 , pulse ox after after 84%, but recovered to 98%    Pt educated on fall risk, safety with mobility, proper ww use, hand placement with transfers , LE exercises, proper breathing technique        Patient response to education:   Pt verbalized understanding Pt demonstrated skill Pt requires further education in this area   x Needs cues   x       Comments:  Pt left  In chair after session, with call light in reach. Rehab potential is Good for reaching above PT goals. Pts/ family goals   1. None stated     Patient and or family understand(s) diagnosis, prognosis, and plan of care. -  Yes     PLAN  PT care will be provided in accordance with the objectives noted above. Whenever appropriate, clear delegation orders will be provided for nursing staff. Exercises and functional mobility practice will be used as well as appropriate assistive devices or modalities to obtain goals. Patient and family education will also be administered as needed. Frequency of treatments will be daily to BID x 2-3 days. Time in:  0815   Time out: 0843       Evaluation Time includes thorough review of current medical information, gathering information on past medical history/social history and prior level of function, completion of standardized testing/informal observation of tasks, assessment of data and education on plan of care and goals.     CPT codes:  [x] Low Complexity PT evaluation 16292  [] Moderate Complexity PT evaluation 02810  [] High Complexity PT evaluation 50383  [] PT Re-evaluation 87716  [] Gait training 81227  minutes  [x] Therapeutic activities 11363 15 minutes  [] Therapeutic exercises 28522  minutes  [] Neuromuscular reeducation 20193  minutes       Yen 18 number:  PT 9346

## 2020-07-08 NOTE — PROGRESS NOTES
functional transfers and functional mobility. Patient required cues for follow through with proper hand/foot placement, pacing, safety, attention, sequencing, precautions and technique in functional transfers, functional mobility, UB dressing and LB dressing in preparation for maximum independence in all self care tasks. Education on completion of spirometer 8 trials with min cues throughout for proper technique. Hand Dominance: Right []  Left []   Strength ROM Additional Info:    RUE  3+/5 WFL good  and FMC/dexterity noted during ADL tasks     LUE 3+/5 WFL good  and FMC/dexterity noted during ADL tasks         Hearing: [place]  Vision: WFL   Sensation:  No c/o numbness or tingling   Tone: WFL   Edema: none                             Long Term Goal (1-3 wks): Pt will maximize functional performance in all self care tasks/functional transfers with good follow through of all trained techniques for safe transition to next level of care    Eval Complexity: Low    Assessment of current deficits   Functional mobility [x]  ADLs [x] Strength [x]  Cognition []  Functional transfers  [x] IADLs [x] Safety Awareness [x]  Endurance [x]  Fine Motor Coordination [] Balance [x] Vision/perception [] Sensation []   Gross Motor Coordination [] ROM [] Delirium []                  Motor Control []    Plan of Care:   ADL retraining [x]   Equipment needs [x]   Neuromuscular re-education [x] Energy Conservation Techniques [x]  Functional Transfer training [x] Patient and/or Family Education [x]  Functional Mobility training [x]  Environmental Modifications [x]  Cognitive re-training []   Compensatory techniques for ADLs [x]  Splinting Needs []   Positioning to improve overall function [x]   Therapeutic Activity [x]  Therapeutic Exercise  [x]  Visual/Perceptual: []    Delirium prevention/treatment  []   Other:  []    Rehab Potential: Good for established goals     Patient / Family Goal: To get home.       Patient and/or family were instructed on functional diagnosis, prognosis/goals and OT plan of care. Pt verbalized fair understanding. Upon arrival, patient seated in armchair. At end of session, patient seated in armchair with call light and phone within reach, all lines and tubes intact. Pt would benefit from continued skilled OT to increase safety and independence with completion of ADL/IADL tasks for functional independence and quality of life.  Bed/chair alarm: ON    Low Evaluation + 10 timed treatment minutes  Treatment Time In:1015   Treatment Time Out: 1025   Treatment Charges: Mins Units    ADL/Home Mgt 51601 4     Thera Activities 40714 6 1    Ther Ex 85078      Manual Therapy 10290      Neuro Re-ed 40877      Orthotic manage/training  12610      Non Billable Time      Total Timed Treatment 10 1          Evaluation time includes thorough review of current medical information, gathering information on past medical history/social history and prior level of function, completion of standardized testing/informal observation of tasks, assessment of data, and development of POC/Goals    Florentin Bring OTR/L  [id number]

## 2020-07-08 NOTE — PROGRESS NOTES
Called Dr. Andrea Prabhakar to update regarding patient status and readiness for discharge. Patient to likely discharge home tomorrow.

## 2020-07-08 NOTE — PROGRESS NOTES
INPATIENT CARDIOLOGY FOLLOW-UP    Name: Davion Hinson    Age: 80 y.o. Date of Admission: 7/6/2020  3:12 PM    Date of Service: 7/8/2020    Chief Complaint: Follow-up for perioperative cardiac evaluation    Interim History:  +mechanical fall / left hip fracture -- s/p surgery on 7/7/2020. Currently with no chest pain, respiratory distress, or palpitations. SR on EKG. No new overnight cardiac complaints.     Review of Systems:   Cardiac: As per HPI  General: No fever, chills  Pulmonary: As per HPI  HEENT: No visual disturbances, difficult swallowing  GI: No nausea, vomiting  Musculoskeletal: ORDONEZ x 4, +left hip fracture  Skin: Intact, no rashes  Neuro/Psych: No headache or seizures    Problem List:  Patient Active Problem List   Diagnosis    Closed left hip fracture, initial encounter ([facility] [address]    HTN (hypertension)    CAD (coronary artery disease)       Allergies:  No Known Allergies    Current Medications:  Current Facility-Administered Medications   Medication Dose Route Frequency Provider Last Rate Last Dose    0.9 % sodium chloride infusion   Intravenous Continuous Pankaj Neely MD   Stopped at 07/08/20 0536    sodium chloride flush 0.9 % injection 10 mL  10 mL Intravenous 2 times per day Pankaj Neely MD   10 mL at 07/08/20 0844    sodium chloride flush 0.9 % injection 10 mL  10 mL Intravenous PRN Pankaj Neely MD        acetaminophen (TYLENOL) tablet 650 mg  650 mg Oral Q6H PRN Pankaj Neely MD   650 mg at 07/08/20 0844    sennosides-docusate sodium (SENOKOT-S) 8.6-50 MG tablet 1 tablet  1 tablet Oral BID Pankaj Neely MD   1 tablet at 07/08/20 0843    magnesium hydroxide (MILK OF MAGNESIA) 400 MG/5ML suspension 30 mL  30 mL Oral Daily PRN Pankaj Neely MD        traMADol Sammieluciano Navarro) tablet 50 mg  50 mg Oral Q6H PRN Pankaj Neely MD   50 mg at 07/08/20 0609    promethazine (PHENERGAN) tablet 12.5 mg  12.5 mg Oral Q6H PRN Pankaj Neeyl MD        Or    ondansetron [facility] injection 4 mg  4 mg Intravenous Q6H PRN Regan Hernandez MD        aspirin EC tablet 162 mg  162 mg Oral Daily Regan Hernandez MD   162 mg at 07/08/20 0847      sodium chloride Stopped (07/08/20 0536)       Physical Exam:  BP (!) 153/70   Pulse 67   Temp 99.1 °F (37.3 °C) (Oral)   Resp 16   Ht 5' 4\" (1.626 m)   Wt 136 lb 4.8 oz (61.8 kg)   SpO2 94%   BMI 23.40 kg/m²   Wt Readings from Last 3 Encounters:   07/07/20 136 lb 4.8 oz (61.8 kg)   07/06/20 131 lb (59.4 kg)     Appearance: Awake, alert, no acute respiratory distress  Skin: Intact, no rash  Head: Normocephalic, atraumatic  Eyes: EOMI, no conjunctival erythema  ENMT: No pharyngeal erythema, MMM, no rhinorrhea  Neck: Supple, no carotid bruits  Lungs: Clear to auscultation bilaterally. No wheezes, rales, or rhonchi. Cardiac: Regular rate and rhythm, 2/6 ANDRA > RUSB  Abdomen: Soft, nontender, +bowel sounds  Extremities: ORDONEZ x 4, no lower extremity edema  Neurologic: Alert, normal mood and affect    Intake/Output:    Intake/Output Summary (Last 24 hours) at 7/8/2020 1045  Last data filed at 7/8/2020 0601  Gross per 24 hour   Intake 1400 ml   Output 875 ml   Net 525 ml     No intake/output data recorded.     Laboratory Tests:  Recent Labs     07/06/20  1149 07/07/20  0407    134   K 3.6 3.9   CL 97* 95*   CO2 27 28   BUN 14 14   CREATININE 1.0 0.9   GLUCOSE 115* 101*   CALCIUM 9.0 9.0     Lab Results   Component Value Date    MG 2.2 07/07/2020     Recent Labs     07/07/20  0407   ALKPHOS 74   ALT 29   AST 31   PROT 7.3   BILITOT 0.7   LABALBU 3.4*     Recent Labs     07/06/20  1149 07/07/20  0407 07/08/20  0310   WBC 9.7 8.2  --    RBC 3.75 3.56  --    HGB 11.7 10.8* 10.4*   HCT 34.8 32.6* 31.6*   MCV 92.8 91.6  --    MCH 31.2 30.3  --    MCHC 33.6 33.1  --    RDW 14.2 14.1  --     229  --    MPV 8.9 8.8  --      Lab Results   Component Value Date    INR 1.2 07/06/2020    PROTIME 12.9 (H) 07/06/2020     Cardiac Tests:  EKG: SR, nonspecific

## 2020-07-08 NOTE — PROGRESS NOTES
Pt alert x 3 awaken  Dangled up with walker  Stood at bedside  Took side steps tolerated well. Assist back in bed. Taking oral intake well. Hourly rounds continued. Bed alarm on.

## 2020-07-08 NOTE — PROGRESS NOTES
Discontinued daily catheter per protocol. Pt due to void 12 to 2 pm instructed to to use call light for assistance out of bed. Pt verbalized  Understanding of above. Hourly rounds continued. Bed alarm on.

## 2020-07-08 NOTE — PLAN OF CARE
Problem: Falls - Risk of:  Goal: Will remain free from falls  Description: Will remain free from falls  7/8/2020 1017 by Vito Canchola RN  Outcome: Met This Shift     Problem: Pain - Acute:  Goal: Pain level will decrease  Description: Pain level will decrease  7/8/2020 1017 by Vito Canchola RN  Outcome: Met This Shift     Problem: Pain:  Goal: Pain level will decrease  Description: Pain level will decrease  7/8/2020 1017 by Vito Canchola RN  Outcome: Met This Shift

## 2020-07-08 NOTE — CARE COORDINATION
Updated discharge plan of care. Pt is going home to son and daughter-in-law's home. ATTEMPTED TO CALL NUMEROUS TIMES. Spoke with patient and she is receptive to home therapies and has no preference. Referral called to I. Pt has [employer] at home. Need to follow with son's home dynamics. Will follow-MJO     addended note- spoke with pt's son and daughter in law. Plan is for her to go to their home. They has ranch home. She has all DME. They are ok with [facility] home care.  Will continue to follow-MJO

## 2020-07-08 NOTE — PROGRESS NOTES
[facility] CARE AT [place] Hospitalist   Progress Note    Admitting Date and Time: 7/6/2020  3:12 PM  Admit Dx: Closed left hip fracture, [facility] ([facility] [S72.002A]    Subjective:    Patient was admitted with Closed left hip fracture, initial encounter ([facility] [address] Columba Khan. Patient sitting up in chair with no complaints of discomfort. Patient could not believe she already had her surgery. Patient is having some difficulty understanding what partial weigh bearing is. ROS: denies fever, chills, cp, sob, n/v, HA unless stated above.      sodium chloride flush  10 mL Intravenous 2 times per day    sennosides-docusate sodium  1 tablet Oral BID    aspirin  162 mg Oral Daily     sodium chloride flush, 10 mL, PRN  acetaminophen, 650 mg, Q6H PRN  magnesium hydroxide, 30 mL, Daily PRN  traMADol, 50 mg, Q6H PRN  promethazine, 12.5 mg, Q6H PRN    Or  ondansetron, 4 mg, Q6H PRN         Objective:    BP (!) 153/70   Pulse 67   Temp 99.1 °F (37.3 °C) (Oral)   Resp 16   Ht 5' 4\" (1.626 m)   Wt 136 lb 4.8 oz (61.8 kg)   SpO2 94%   BMI 23.40 kg/m²   General Appearance: alert and oriented to person, place and time, well-developed and well nourished, in no acute distress but confused   Skin: warm and dry and left hip dressing dry and intact   Head: normocephalic and atraumatic  Eyes: pupils equal, round, and reactive to light and conjunctivae normal  ENT: hearing abnormal- hard of hearing   Neck: neck supple and non tender without mass   Pulmonary/Chest: diminished but clear to auscultation bilaterally- no wheezes, rales or rhonchi, normal air movement, no respiratory distress  Cardiovascular: normal rate, regular rhythm and intact distal pulses  Abdomen: soft, non-tender, non-distended and bowel sounds normal  Extremities: no cyanosis, no clubbing and no edema  Musculoskeletal: no swollen joints, no joint deformity and pain elicited with movement of  left hip/leg  Neurologic: no cranial nerve deficit, speech normal and gait abnormal- partial weight bearing       Recent Labs     07/06/20  1149 07/07/20  0407    134   K 3.6 3.9   CL 97* 95*   CO2 27 28   BUN 14 14   CREATININE 1.0 0.9   GLUCOSE 115* 101*   CALCIUM 9.0 9.0       Recent Labs     07/06/20  1149 07/07/20  0407 07/08/20  0310   WBC 9.7 8.2  --    RBC 3.75 3.56  --    HGB 11.7 10.8* 10.4*   HCT 34.8 32.6* 31.6*   MCV 92.8 91.6  --    MCH 31.2 30.3  --    MCHC 33.6 33.1  --    RDW 14.2 14.1  --     229  --    MPV 8.9 8.8  --          Radiology:   [facility] FOR SURGICAL PROCEDURES   Final Result      Internal fixation of proximal femur fracture. Assessment:    Principal Problem:    Closed left hip fracture, initial encounter ([facility] [address]  Active Problems:    HTN (hypertension)    CAD (coronary artery disease)  Resolved Problems:    * No resolved hospital problems. *      Plan:  1. Closed left Hip Fracture  - secondary to fall - orthopedic surgery on - S/p L ORIF - partial weigh bearing - PT/OT following  - IVF's completed  - Norco for pain - HAWA krause   2. Stage II Diastolic HFpEF- cardiology following - echocardiogram completed EF 65% - no chest pain or SOB - on Aspirin - continue current mediation regimen   3. Hypertension - blood pressure controlled    4. Coronary Artery Disease - no chest pain    5. Fall - while at home - PT/OT - falls precaution maintained    6. ? Pneumonia - diminished lungs asymptomatic - no cough or fever   7. Incidental finding Bilateral Thyroid Mass   8. Degenerative Bone Disease - most likely d/t normal age process   5.  Normocytic Anemia - H&H remains stable     Disposition - discharge home with [facility] per orthopedic surgery possibly tomorrow     Electronically signed by JANICE Griggs - CNP on 7/8/2020 at 9:28 AM

## 2020-07-08 NOTE — PATIENT CARE CONFERENCE
[facility] Quality Flow/Interdisciplinary Rounds Progress Note        Quality Flow Rounds held on July 8, 2020    Disciplines Attending:  Bedside Nurse, ,  and Nursing Unit Leadership    Conner Esquivel was admitted on 7/6/2020  3:12 PM    Anticipated Discharge Date:  Expected Discharge Date: 07/09/20    Disposition:    Oral Score:  Oral Scale Score: 18    Readmission Risk              Risk of Unplanned Readmission:        10           Discussed patient goal for the day, patient clinical progression, and barriers to discharge. The following Goal(s) of the Day/Commitment(s) have been identified:  Adequate progress with ambulation and safety.       Renata Pandey  July 8, 2020

## 2020-07-08 NOTE — PROGRESS NOTES
ORIF left hip fracture    Post op Day  1      S:  Complaints: none    O:  Afebrile, Vital signs stable     Dressing Intact   Full range of motion left ankle and toes   Sensation intact to light touch     H/H:   Recent Labs     07/08/20  0310   HGB 10.4*   HCT 31.6*        PT/INR:   Recent Labs     07/06/20  1149 07/07/20  0407   PROT  --  7.3   INR 1.2  --                    Xray:  stable implants    A/P:  Stable   Acute post-op blood loss anemia-stable   Proceed with Physical Therapy. Hopefully patient does well and is in good shape for discharge to home either later today or tomorrow. It looks as though she will likely not require rehab. D/C Franklin catheter              Heplock IV's if PO intake is adequate              Aspirin for DVT prophylaxis, 2 baby aspirin daily   Evaluate for Home Discharge versus Rehab   Home health care needed secondary to unsteady gait, walker for ambulation,                      and need for home physical therapy.

## 2020-07-08 NOTE — CARE COORDINATION
Social Work:    Social work confirmed plans with Janis Bolanos & his wife for discharge home to patient's son Brice's home with [facility] [address] [facility] following.     Electronically signed by JAK Asencio on 7/8/2020 at 3:31 PM

## 2020-07-08 NOTE — PLAN OF CARE
Problem: Falls - Risk of:  Goal: Will remain free from falls  Description: Will remain free from falls  Outcome: Met This Shift  Goal: Absence of physical injury  Description: Absence of physical injury  Outcome: Met This Shift     Problem: Skin Integrity:  Goal: Will show no infection signs and symptoms  Description: Will show no infection signs and symptoms  Outcome: Met This Shift  Goal: Absence of new skin breakdown  Description: Absence of new skin breakdown  Outcome: Met This Shift     Problem: Discharge Planning:  Goal: Knowledge of discharge instructions  Description: Knowledge of discharge instructions  Outcome: Met This Shift     Problem: Infection - Surgical Site:  Goal: Signs of wound healing will improve  Description: Signs of wound healing will improve  Outcome: Met This Shift     Problem: Mobility - Impaired:  Goal: Achieve maximum mobility level  Description: Achieve maximum mobility level  Outcome: Met This Shift     Problem: Pain - Acute:  Goal: Pain level will decrease  Description: Pain level will decrease  Outcome: Met This Shift

## 2020-07-09 VITALS
HEIGHT: 64 IN | DIASTOLIC BLOOD PRESSURE: 64 MMHG | RESPIRATION RATE: 16 BRPM | TEMPERATURE: 99 F | WEIGHT: 136.3 LBS | HEART RATE: 63 BPM | OXYGEN SATURATION: 95 % | SYSTOLIC BLOOD PRESSURE: 148 MMHG | BODY MASS INDEX: 23.27 KG/M2

## 2020-07-09 LAB
HCT VFR BLD CALC: 35.3 % (ref 34–48)
HEMOGLOBIN: 11.5 G/DL (ref 11.5–15.5)
MRSA CULTURE ONLY: NORMAL

## 2020-07-09 PROCEDURE — 97535 SELF CARE MNGMENT TRAINING: CPT

## 2020-07-09 PROCEDURE — 99239 HOSP IP/OBS DSCHRG MGMT >30: CPT | Performed by: INTERNAL MEDICINE

## 2020-07-09 PROCEDURE — 85018 HEMOGLOBIN: CPT

## 2020-07-09 PROCEDURE — 36415 COLL VENOUS BLD VENIPUNCTURE: CPT

## 2020-07-09 PROCEDURE — 97530 THERAPEUTIC ACTIVITIES: CPT

## 2020-07-09 PROCEDURE — 85014 HEMATOCRIT: CPT

## 2020-07-09 PROCEDURE — APPSS45 APP SPLIT SHARED TIME 31-45 MINUTES: Performed by: NURSE PRACTITIONER

## 2020-07-09 PROCEDURE — 6370000000 HC RX 637 (ALT 250 FOR IP): Performed by: NURSE PRACTITIONER

## 2020-07-09 PROCEDURE — 6370000000 HC RX 637 (ALT 250 FOR IP): Performed by: ORTHOPAEDIC SURGERY

## 2020-07-09 PROCEDURE — 2580000003 HC RX 258: Performed by: ORTHOPAEDIC SURGERY

## 2020-07-09 PROCEDURE — 6370000000 HC RX 637 (ALT 250 FOR IP): Performed by: INTERNAL MEDICINE

## 2020-07-09 RX ORDER — CLONIDINE HYDROCHLORIDE 0.2 MG/1
0.2 TABLET ORAL 2 TIMES DAILY
Status: DISCONTINUED | OUTPATIENT
Start: 2020-07-09 | End: 2020-07-09 | Stop reason: HOSPADM

## 2020-07-09 RX ORDER — ENALAPRIL MALEATE 10 MG/1
10 TABLET ORAL DAILY
Status: DISCONTINUED | OUTPATIENT
Start: 2020-07-09 | End: 2020-07-09 | Stop reason: HOSPADM

## 2020-07-09 RX ORDER — SENNA AND DOCUSATE SODIUM 50; 8.6 MG/1; MG/1
1 TABLET, FILM COATED ORAL 2 TIMES DAILY
Qty: 60 TABLET | Refills: 0 | Status: SHIPPED | OUTPATIENT
Start: 2020-07-09

## 2020-07-09 RX ORDER — ASPIRIN 81 MG/1
162 TABLET ORAL DAILY
Qty: 60 TABLET | Refills: 0 | Status: SHIPPED | OUTPATIENT
Start: 2020-07-10

## 2020-07-09 RX ORDER — AMLODIPINE BESYLATE 5 MG/1
5 TABLET ORAL DAILY
Status: DISCONTINUED | OUTPATIENT
Start: 2020-07-09 | End: 2020-07-09 | Stop reason: HOSPADM

## 2020-07-09 RX ORDER — TRAMADOL HYDROCHLORIDE 50 MG/1
50 TABLET ORAL EVERY 6 HOURS PRN
Qty: 12 TABLET | Refills: 0 | Status: SHIPPED | OUTPATIENT
Start: 2020-07-09 | End: 2020-07-12

## 2020-07-09 RX ADMIN — Medication 10 ML: at 08:35

## 2020-07-09 RX ADMIN — DOCUSATE SODIUM 50 MG AND SENNOSIDES 8.6 MG 1 TABLET: 8.6; 5 TABLET, FILM COATED ORAL at 08:34

## 2020-07-09 RX ADMIN — ASPIRIN 162 MG: 81 TABLET, COATED ORAL at 08:34

## 2020-07-09 RX ADMIN — CARVEDILOL 25 MG: 25 TABLET, FILM COATED ORAL at 08:34

## 2020-07-09 RX ADMIN — AMLODIPINE BESYLATE 5 MG: 5 TABLET ORAL at 11:20

## 2020-07-09 RX ADMIN — CLONIDINE HYDROCHLORIDE 0.2 MG: 0.2 TABLET ORAL at 11:20

## 2020-07-09 RX ADMIN — ENALAPRIL MALEATE 10 MG: 10 TABLET ORAL at 13:06

## 2020-07-09 NOTE — CARE COORDINATION
Social Work discharge planning   Will check PT OT today for possible discharge today with [facility].  Electronically signed by DIANA López on 7/9/2020 at 9:21 AM

## 2020-07-09 NOTE — PROGRESS NOTES
[facility] Quality Flow/Interdisciplinary Rounds Progress Note        Quality Flow Rounds held on July 9, 2020    Disciplines Attending:  Bedside Nurse, ,  and Nursing Unit Leadership    Apurva Norris was admitted on 7/6/2020  3:12 PM    Anticipated Discharge Date:  Expected Discharge Date: 07/09/20    Disposition:    Oral Score:  Oral Scale Score: 18    Readmission Risk              Risk of Unplanned Readmission:        10           Discussed patient goal for the day, patient clinical progression, and barriers to discharge.   The following Goal(s) of the Day/Commitment(s) have been identified:  Discharge Planning      Haim Medrano  July 9, 2020

## 2020-07-09 NOTE — PROGRESS NOTES
plus balance during self care tasks         B UE were [place] during tasks. Comments: Upon arrival pt was sitting in arm chair. At end of session pt was left in rehab gym with PT to partake in physical therapy session. Treatment: Pt noncompliant with WB restrictions despite max vc, tactile cues and demos from therapist.  Therapist brought pt to rehab gym in w/c due to limited ability to follow WB restrictions. AE not discussed due to limited carry over of instructions due to confusion. Pt unable to identify DME options she currently owns. SW notified that pt will need extended tub bench to maintain WB restrictions during tub transfers. Pt agreeable to DME but SW encouraged to contact family due to pt confusion. Education: Safety training, techniques to maintain WB restrictions, DME, AD management and tub transfer training. · Pt has made good progress towards set goals.    · Continue with current plan of care      Treatment Charges: Mins Units   Ther Ex  95843     Manual Therapy 82144     Thera Activities 86630 9 1   ADL/Home Mgt 55504 15 1   Neuro Re-ed 93994     Group Therapy      Orthotic manage/training  48930     Non-Billable Time     Total Timed Treatment 24 2       [address] AVERY/L [id number]

## 2020-07-09 NOTE — DISCHARGE SUMMARY
[facility] EMERGENCY SERVICE Physician Discharge Summary       Cesar Dunne DO  [address] [phone]    Schedule an appointment as soon as possible for a visit in 1 week  Call for a post hospital follow up appointment     [facility] [address]   [address] [address]  [address] M[facility]  [phone]    Schedule an appointment as soon as possible for a visit in 3 weeks  For wound re-check, Call for a follow up appointment    Mackenzie Waite MD  [address] [address]  [phone]    Schedule an appointment as soon as possible for a visit in 1 month  Call for a follow up appointment       Activity level: As tolerated    Diet: DIET GENERAL;    Dispo: Home with home health care    Condition at discharge: Stable      Patient ID:  Lynne Araujo  [id number]  85 y.o.  7/4/1921    Admit date: 7/6/2020    Discharge date and time:  7/9/2020  2:22 PM    Admission Diagnoses: Principal Problem:    Closed left [facility]  Active Problems:    HTN (hypertension)    CAD (coronary artery disease)  Resolved Problems:    * No resolved hospital problems. *      Discharge Diagnoses: Principal Problem:    Closed left hip fracture, initial encounter ([facility]  Active Problems:    HTN (hypertension)    CAD (coronary artery disease)  Resolved Problems:    * No resolved hospital problems. *      Consults:  IP CONSULT TO SOCIAL WORK  IP CONSULT TO ORTHOPEDIC SURGERY  IP CONSULT TO CASE MANAGEMENT  IP CONSULT TO CARDIOLOGY  IP CONSULT TO HOSPITALIST  IP CONSULT TO CASE MANAGEMENT  IP CONSULT TO SOCIAL WORK  IP CONSULT TO HOME CARE NEEDS  IP CONSULT TO HOME CARE NEEDS    Procedures: Left hip open reduction internal fixation    Hospital Course: Patient was admitted with Closed left hip fracture, initial encounter ([facility] Deni Manjarrez.  Patient 68-year-old female who presented to the ED with a fall at home. Patient was admitted with a closed left hip fracture. During patient's hospital stay patient was seen by cardiology for cardiac clearance, which gave her a score of a class I risk therefore she surgery was proceeded. Prior to surgery patient had a echocardiogram that demonstrated Ejection fraction is visually estimated at 65%. There is doppler evidence of stage II diastolic dysfunction. Mild mitral regurgitation, mild aortic regurgitation and mild aortic stenosis. Mild-moderate tricuspid regurgitation and pulmonary HTN / PASP is estimated at 69 mmHg. Orthopedic surgery was consulted for left hip pain. Patient underwent a left open reduction internal fixation of the hip for which she tolerated well. Patient was seen by PT/OT as she is partial weightbearing on the left leg. Patient is having difficulty understanding what partial weightbearing means and just how much weight she can put on that leg. Patient family does not want her to go to rehab therefore she will be going home with [facility]. Patient has remained stable and afebrile therefore she will be discharged home with the following medications and instruction.       Discharge Exam:  Vitals:    07/09/20 0315 07/09/20 0830 07/09/20 1218 07/09/20 1300   BP: (!) 184/79 (!) 192/94 (!) 125/55 (!) 148/64   Pulse: 72 72 63    Resp: 16 16 16    Temp: 98.8 °F (37.1 °C) 98.4 °F (36.9 °C) 99 °F (37.2 °C)    TempSrc: Oral Oral Oral    SpO2:  95%     Weight:       Height:           General Appearance: alert and oriented to person, place and time, well-developed and well-nourished, in no acute distress  Skin: warm and dry, no rash or erythema  Head: normocephalic and atraumatic  Eyes: pupils equal, round, and reactive to light and conjunctivae normal  ENT: hearing abnormal- hard of hearing   Neck: neck supple and non tender without mass   Pulmonary/Chest: clear to auscultation bilaterally- no wheezes, rales or rhonchi, normal air movement, no respiratory distress  Cardiovascular: normal rate, regular rhythm and intact distal pulses  Abdomen: soft, non-tender, non-distended, normal bowel sounds, no masses or organomegaly  Extremities: no cyanosis, no clubbing and no edema  Musculoskeletal: normal range of motion, no joint swelling, deformity but positive tenderness of left leg  Neurologic: no cranial nerve deficit, speech normal and gait abnormal- unsteady d/t left hip ORIF    I/O last 3 completed shifts: In: 200 [P.O.:200]  Out: 1000 [Urine:1000]  I/O this shift:  In: -   Out: 250 [Urine:250]      LABS:  Recent Labs     07/07/20 0407      K 3.9   CL 95*   CO2 28   BUN 14   CREATININE 0.9   GLUCOSE 101*   CALCIUM 9.0       Recent Labs     07/07/20 0407 07/08/20  0310 07/09/20  0905   WBC 8.2  --   --    RBC 3.56  --   --    HGB 10.8* 10.4* 11.5   HCT 32.6* 31.6* 35.3   MCV 91.6  --   --    MCH 30.3  --   --    MCHC 33.1  --   --    RDW 14.1  --   --      --   --    MPV 8.8  --   --        No results for input(s): POCGLU in the last 72 hours. Imaging:   [facility]O FOR SURGICAL PROCEDURES   Final Result      Internal fixation of proximal femur fracture. Patient Instructions:      Medication List      START taking these medications    aspirin 81 MG EC tablet  Take 2 tablets by mouth daily  Start taking on:  July 10, 2020     sennosides-docusate sodium 8.6-50 MG tablet  Commonly known as:  SENOKOT-S  Take 1 tablet by mouth 2 times daily     traMADol 50 MG tablet  Commonly known as:  ULTRAM  Take 1 tablet by mouth every 6 hours as needed for Pain for up to 3 days.         CONTINUE taking these medications    amLODIPine 5 MG tablet  Commonly known as:  NORVASC     citalopram 20 MG tablet  Commonly known as:  CELEXA     cloNIDine 0.2 MG tablet  Commonly known as:  CATAPRES     Coreg 25 MG tablet  Generic drug:  carvedilol     enalapril 10 MG tablet  Commonly known as:  VASOTEC     Vitamin D3 1.25 MG (49286 UT) Caps           Where to Get Your Medications      You can get these medications from any pharmacy    Bring a paper prescription for each of these medications  · aspirin 81 MG EC tablet  · sennosides-docusate sodium 8.6-50 MG tablet  · traMADol 50 MG tablet           Note that more than 30 minutes was spent in preparing discharge papers, discussing discharge with patient, medication review, etc.    Signed:  Electronically signed by JANICE Marin CNP on 7/9/2020 at 2:22 PM    NOTE: This report was transcribed using voice recognition software. Every effort was made to ensure accuracy; however, inadvertent computerized transcription errors may be present.

## 2020-07-09 NOTE — PROGRESS NOTES
Physical Therapy  Facility/Department: [address] ORTHO SURGERY  Daily Treatment Note  NAME: Chinedu Vasquez  : 1921  MRN: [id number]    Date of Service: 2020      Patient Diagnosis(es): There were no encounter diagnoses. has a past medical history of CAD (coronary artery disease) and Hypertension. has a past surgical history that includes fracture surgery; Cholecystectomy; and Hip fracture surgery (Left, 2020). Evaluating Therapist: Lauren rIizarry PT      Referring Provider:  Jaqueline Cheng MD     Room #: 065   DIAGNOSIS:  Closed L hip fx s/p ORIF 2020  PRECAUTIONS: falls, L LE PWB, O2 @ 2 LNC      Social:  Pt lives alone  in a  1 floor apartment, 6-7  steps and  1  rails to enter. Prior to admission pt walked with  ww        Initial Evaluation  Date:  2020 Treatment     2020 Short Term/ Long Term   Goals   Was pt agreeable to Eval/treatment? yes   yes      Does pt have pain?  minimal L hip pain   no c/o pain      Bed Mobility  Rolling:  NT   Supine to sit:  Min assist   Sit to supine:  NT   Scooting:  Min assist   sit to supine : SBA   SBA    Transfers Sit to stand:  Min assist from chair, mod assist from bed   Stand to sit: mod assist   Stand pivot: Mod assist   Sit <> stand : CGA to min assist depending on surface   SBA    Ambulation    35  feet with  ww  with  L LE PWB ( pt unable to maintain ) min assist   15 feet x 2 and 30 feet x 1 with ww CGA/min assist. Pt unable to maintain L LE PWB    feet with ww  with L LE PWB          Stair negotiation: ascended and descended NT   4 steps with 2 HR CGA/min assist   4  steps with  1  rail with  [employer] assist    LE ROM  Grossly WFL        LE strength  L hip 3- to 3/ 5        AM- PAC RAW score   15/ 24   18/ 24               Pt is alert and Oriented x  3       Balance:  CGA/Min assist   Endurance: decreased   Bed/Chair alarm:   Yes      ASSESSMENT    Pt displays functional ability as noted in the objective portion of this treatment          Treatment/Education:     Mobility as above. Pt unable to maintain L LE PWB with constant cues. Does not comprehend L LE PWB  Pt unsafe to return home independently; will need strong family support        Pt educated on fall risk, safety with mobility, proper ww use, hand placement with transfers , sequencing with steps         Patient response to education:   Pt verbalized understanding Pt demonstrated skill Pt requires further education in this area   x Needs cues   x         Comments:  Pt left  In bed  after session, with call light in reach.        Rehab potential is Good for reaching above PT goals.          PLAN    PT care will be provided in accordance with the objectives noted above. Whenever appropriate, clear delegation orders will be provided for nursing staff. Exercises and functional mobility practice will be used as well as appropriate assistive devices or modalities to obtain goals. Patient and family education will also be administered as needed.     Frequency of treatments will be daily to BID x 2-3 days. Con't with PT POC      Time in:  0925  Time out: 0952             CPT codes:  []? Low Complexity PT evaluation [id number]  []? Moderate Complexity PT evaluation 91543  []? High Complexity PT evaluation Q120511  []? PT Re-evaluation [id number]  []? Gait training 99224  minutes  [x]? Therapeutic activities 50747 25 minutes  []? Therapeutic exercises 42466  minutes  []?  Neuromuscular reeducation 01087  minutes         Yen 18 number:  PT 9600

## 2020-07-09 NOTE — PROGRESS NOTES
ORIF Left Femoral neck Fracture    Post op Day 2      S:  Complaints: none    O:  Afebrile, Vital signs stable     Dressing Intact   Full range of motion left ankle and toes   Sensation intact to light touch     H/H:   Recent Labs     07/08/20  0310   HGB 10.4*   HCT 31.6*        PT/INR:   Recent Labs     07/06/20  1149 07/07/20  0407   PROT  --  7.3   INR 1.2  --        A/P:  Stable, status post osteoporotic left femoral neck fracture secondary to a fall that would not have ordinarily resulted in fracture in normal bone   Proceed with Physical Therapy              Patient appears comfortable. If she does well with physical therapy today she can potentially be discharged today, with plans to go to her son's house. Baby aspirin 2 tabs daily for DVT prophylaxis until patient returns to weightbearing as tolerated. Follow-up in 3 weeks with me.

## 2020-07-09 NOTE — CARE COORDINATION
UPDATED DISCHARGE PLAN OF CARE. PT TO DISCHARGE HOME TODAY WITH FAMILY, HAS DME.  [facility] NOTIFIED OF DISCHARGE AND HOME CARE ORDERS ARE ON THE CHART-MJO

## 2020-07-10 ENCOUNTER — CARE COORDINATION (OUTPATIENT)
Dept: CASE MANAGEMENT | Age: 85
End: 2020-07-10

## 2020-07-10 NOTE — CARE COORDINATION
[address] [place] [address] Transitions Initial Follow Up Call    Call within 2 business days of discharge: Yes    Patient: Evern Limb Patient : 1921   MRN: [id number]  Reason for Admission: left hip fracture, s/o ORIF 2020  Discharge Date: 20 RARS: Readmission Risk Score: 10      Last Discharge [facility]       Complaint Diagnosis Description Type Department Provider    20  Closed left hip fracture, initial encounter [facility]) Admission (Discharged) NICK HardyW Tal Cooper, DO    20 Fall; Hip Pain Closed fracture of neck of left femur, initial encounter [facility]) ED (TRANSFER) GENA RYAN ED Remington Edmondson MD        Facility: [facility]     First to reach the patient for [facility] Care Transition call post hospital discharge. Message left with CTN's contact information requesting return phone call. No HIPAA on file. Spoke with Duc Cuevas at [facility], he stated nursing is scheduled for [facility] today and therapy will begin tomorrow, 2020. Follow Up  No future appointments.     Selin Garcia, RN

## 2020-07-11 ENCOUNTER — CARE COORDINATION (OUTPATIENT)
Dept: CASE MANAGEMENT | Age: 85
End: 2020-07-11

## 2020-07-11 NOTE — CARE COORDINATION
[facility] [address] Transitions Initial Follow Up Call    Call within 2 business days of discharge: Yes    Patient: Laura Alston Patient : 1921   MRN: [id number]  Reason for Admission: Closed left hip fracture, initial encounter  Discharge Date: 20 RARS: Readmission Risk Score: 10      Last Discharge [facility]       Complaint Diagnosis Description Type Department Provider    20  Closed left hip fracture, initial encounter [facility]) Admission (Discharged) NICK 7W Deadra Men, DO    20 Fall; Hip Pain Closed fracture of neck of left femur, initial encounter [facility]) ED (TRANSFER) GENA AUS ED Alhaji Blount MD           Spoke with: No one    Facility: [facility]    Second attempt to reach the patient for initial Care Transition call post hospital discharge. Message left with CTN's contact information requesting return phone call. No HIPAA on file. This CTN to hand off to [employer].      Follow Up  No future appointments.     Henrique Mcdaniel RN

## 2020-07-13 ENCOUNTER — TELEPHONE (OUTPATIENT)
Dept: CARDIOLOGY CLINIC | Age: 85
End: 2020-07-13

## 2020-10-07 ENCOUNTER — CARE COORDINATION (OUTPATIENT)
Dept: CASE MANAGEMENT | Age: 85
End: 2020-10-07

## 2020-12-23 ENCOUNTER — HOSPITAL ENCOUNTER (EMERGENCY)
Age: 85
Discharge: HOME | End: 2020-12-23
Attending: EMERGENCY MEDICINE
Payer: MEDICARE

## 2020-12-23 ENCOUNTER — APPOINTMENT (OUTPATIENT)
Dept: GENERAL RADIOLOGY | Age: 85
End: 2020-12-23
Payer: MEDICARE

## 2020-12-23 VITALS
SYSTOLIC BLOOD PRESSURE: 154 MMHG | TEMPERATURE: 98.6 F | BODY MASS INDEX: 23.73 KG/M2 | WEIGHT: 139 LBS | HEIGHT: 64 IN | OXYGEN SATURATION: 99 % | DIASTOLIC BLOOD PRESSURE: 72 MMHG | HEART RATE: 60 BPM | RESPIRATION RATE: 18 BRPM

## 2020-12-23 LAB
ANION GAP SERPL CALCULATED.3IONS-SCNC: 9 MMOL/L (ref 7–16)
BACTERIA: ABNORMAL /HPF
BASOPHILS ABSOLUTE: 0.01 E9/L (ref 0–0.2)
BASOPHILS RELATIVE PERCENT: 0.1 % (ref 0–2)
BILIRUBIN URINE: NEGATIVE
BLOOD, URINE: NEGATIVE
BUN BLDV-MCNC: 37 MG/DL (ref 8–23)
CALCIUM SERPL-MCNC: 9.1 MG/DL (ref 8.6–10.2)
CHLORIDE BLD-SCNC: 90 MMOL/L (ref 98–107)
CLARITY: CLEAR
CO2: 30 MMOL/L (ref 22–29)
COLOR: YELLOW
CREAT SERPL-MCNC: 1.1 MG/DL (ref 0.5–1)
EOSINOPHILS ABSOLUTE: 0.03 E9/L (ref 0.05–0.5)
EOSINOPHILS RELATIVE PERCENT: 0.2 % (ref 0–6)
EPITHELIAL CELLS, UA: ABNORMAL /HPF
GFR AFRICAN AMERICAN: 55
GFR NON-AFRICAN AMERICAN: 46 ML/MIN/1.73
GLUCOSE BLD-MCNC: 116 MG/DL (ref 74–99)
GLUCOSE URINE: NEGATIVE MG/DL
HCT VFR BLD CALC: 34.8 % (ref 34–48)
HEMOGLOBIN: 11.9 G/DL (ref 11.5–15.5)
IMMATURE GRANULOCYTES #: 0.09 E9/L
IMMATURE GRANULOCYTES %: 0.6 % (ref 0–5)
KETONES, URINE: NEGATIVE MG/DL
LEUKOCYTE ESTERASE, URINE: ABNORMAL
LYMPHOCYTES ABSOLUTE: 1 E9/L (ref 1.5–4)
LYMPHOCYTES RELATIVE PERCENT: 7 % (ref 20–42)
MCH RBC QN AUTO: 30.9 PG (ref 26–35)
MCHC RBC AUTO-ENTMCNC: 34.2 % (ref 32–34.5)
MCV RBC AUTO: 90.4 FL (ref 80–99.9)
MONOCYTES ABSOLUTE: 1.74 E9/L (ref 0.1–0.95)
MONOCYTES RELATIVE PERCENT: 12.1 % (ref 2–12)
NEUTROPHILS ABSOLUTE: 11.51 E9/L (ref 1.8–7.3)
NEUTROPHILS RELATIVE PERCENT: 80 % (ref 43–80)
NITRITE, URINE: NEGATIVE
OVALOCYTES: ABNORMAL
PDW BLD-RTO: 14.4 FL (ref 11.5–15)
PH UA: 6 (ref 5–9)
PLATELET # BLD: 365 E9/L (ref 130–450)
PMV BLD AUTO: 8.7 FL (ref 7–12)
POIKILOCYTES: ABNORMAL
POTASSIUM SERPL-SCNC: 3.8 MMOL/L (ref 3.5–5)
PROTEIN UA: NEGATIVE MG/DL
RBC # BLD: 3.85 E12/L (ref 3.5–5.5)
RBC UA: ABNORMAL /HPF (ref 0–2)
SODIUM BLD-SCNC: 129 MMOL/L (ref 132–146)
SPECIFIC GRAVITY UA: 1.01 (ref 1–1.03)
UROBILINOGEN, URINE: 0.2 E.U./DL
WBC # BLD: 14.4 E9/L (ref 4.5–11.5)
WBC UA: ABNORMAL /HPF (ref 0–5)

## 2020-12-23 PROCEDURE — 85025 COMPLETE CBC W/AUTO DIFF WBC: CPT

## 2020-12-23 PROCEDURE — 81001 URINALYSIS AUTO W/SCOPE: CPT

## 2020-12-23 PROCEDURE — 80048 BASIC METABOLIC PNL TOTAL CA: CPT

## 2020-12-23 PROCEDURE — 71045 X-RAY EXAM CHEST 1 VIEW: CPT

## 2020-12-23 PROCEDURE — 6370000000 HC RX 637 (ALT 250 FOR IP): Performed by: EMERGENCY MEDICINE

## 2020-12-23 PROCEDURE — 99284 EMERGENCY DEPT VISIT MOD MDM: CPT

## 2020-12-23 RX ORDER — DOXYCYCLINE HYCLATE 100 MG
100 TABLET ORAL 2 TIMES DAILY
Qty: 20 TABLET | Refills: 0 | Status: SHIPPED | OUTPATIENT
Start: 2020-12-23 | End: 2021-01-02

## 2020-12-23 RX ORDER — CEFDINIR 300 MG/1
300 CAPSULE ORAL ONCE
Status: COMPLETED | OUTPATIENT
Start: 2020-12-23 | End: 2020-12-23

## 2020-12-23 RX ORDER — BENZONATATE 100 MG/1
100 CAPSULE ORAL 3 TIMES DAILY PRN
Qty: 21 CAPSULE | Refills: 0 | Status: SHIPPED | OUTPATIENT
Start: 2020-12-23 | End: 2020-12-30

## 2020-12-23 RX ORDER — CEFDINIR 300 MG/1
300 CAPSULE ORAL DAILY
Qty: 10 CAPSULE | Refills: 0 | Status: SHIPPED | OUTPATIENT
Start: 2020-12-23 | End: 2021-01-02

## 2020-12-23 RX ORDER — DOXYCYCLINE HYCLATE 100 MG/1
100 CAPSULE ORAL ONCE
Status: COMPLETED | OUTPATIENT
Start: 2020-12-23 | End: 2020-12-23

## 2020-12-23 RX ORDER — SODIUM CHLORIDE 0.9 % (FLUSH) 0.9 %
10 SYRINGE (ML) INJECTION PRN
Status: DISCONTINUED | OUTPATIENT
Start: 2020-12-23 | End: 2020-12-23 | Stop reason: HOSPADM

## 2020-12-23 RX ADMIN — DOXYCYCLINE HYCLATE 100 MG: 100 CAPSULE ORAL at 14:14

## 2020-12-23 RX ADMIN — CEFDINIR 300 MG: 300 CAPSULE ORAL at 14:14

## 2020-12-23 ASSESSMENT — ENCOUNTER SYMPTOMS
DIARRHEA: 0
SHORTNESS OF BREATH: 1
SINUS PRESSURE: 0
VOMITING: 0
EYE DISCHARGE: 0
EYE REDNESS: 0
BACK PAIN: 0
EYE PAIN: 0
COUGH: 1
WHEEZING: 0
RHINORRHEA: 0
NAUSEA: 0
ABDOMINAL DISTENTION: 0
SORE THROAT: 0

## 2020-12-23 NOTE — ED PROVIDER NOTES
77-year-old female history of hypertension and a previous left hip fracture who uses a walker to ambulate at home presents to the emergency department with shortness of breath and cough. Patient was diagnosed with COVID-19 on December 14. When I discussed the case with her son he states she has been coughing a lot recently that had some increased shortness of breath that she stated to family prompting her to be sent to the emergency department for further evaluation. She states no fevers known nausea vomiting diarrhea belly pain urinary symptoms or leg swelling and states no other complaints at this time. The history is provided by the patient. Cough  Cough characteristics:  Non-productive  Sputum characteristics:  Nondescript  Severity:  Mild  Onset quality:  Gradual  Duration:  8 days  Timing:  Intermittent  Progression:  Waxing and waning  Chronicity:  New  Context: upper respiratory infection    Relieved by:  Nothing  Worsened by:  Nothing  Ineffective treatments:  None tried  Associated symptoms: shortness of breath    Associated symptoms: no chest pain, no chills, no ear pain, no eye discharge, no fever, no headaches, no rash, no rhinorrhea, no sore throat, no weight loss and no wheezing         Review of Systems   Constitutional: Negative for chills, fever and weight loss. HENT: Negative for ear pain, rhinorrhea, sinus pressure and sore throat. Eyes: Negative for pain, discharge and redness. Respiratory: Positive for cough and shortness of breath. Negative for wheezing. Cardiovascular: Negative for chest pain. Gastrointestinal: Negative for abdominal distention, diarrhea, nausea and vomiting. Genitourinary: Negative for dysuria and frequency. Musculoskeletal: Negative for arthralgias and back pain. Skin: Negative for rash and wound. Neurological: Negative for weakness and headaches. Hematological: Negative for adenopathy. All other systems reviewed and are negative. Physical Exam  Constitutional:       Appearance: She is well-developed. HENT:      Head: Normocephalic and atraumatic. Eyes:      Pupils: Pupils are equal, round, and reactive to light. Neck:      Musculoskeletal: Normal range of motion. Cardiovascular:      Rate and Rhythm: Normal rate and regular rhythm. Pulmonary:      Effort: Pulmonary effort is normal.      Breath sounds: Normal breath sounds. No decreased breath sounds, wheezing, rhonchi or rales. Musculoskeletal: Normal range of motion. Skin:     General: Skin is warm. Capillary Refill: Capillary refill takes less than 2 seconds. Neurological:      General: No focal deficit present. Mental Status: She is alert and oriented to person, place, and time. Procedures     MDM  Number of Diagnoses or Management Options       ED Course as of Dec 23 1431   Wed Dec 23, 2020   1141 Patient resting comfortably. Alert and oriented satting 96% on RA. She appears to be resting comfortably and in no distress. She states she ambulates with a walker at home. Attempting to reach out to [place] her daughter in law      [CF]   12 Discussed case with son. He states patient has been coughing a lot and was complaining about increased shortness of breath today. [CF]   2040 Patient resting comfortably. Patient CXR shows possible infiltrates though previous xray looked similar earlier in the year when she fractured her hip. She is given initial dose of abx to cover possible bacterial pneumonia, but felt safe for discharge. Patient's son updated. [CF]   2213 Discussed where patient's pharmacy is with patient's son and he states the [facility]. Prescription sent to that facility.     [CF]      ED Course User Index  [CF] Benedicto Schulte,       --------------------------------------------- PAST HISTORY ---------------------------------------------  Past Medical History:  has a past medical history of CAD (coronary artery disease) and Hypertension. Past Surgical History:  has a past surgical history that includes fracture surgery; Cholecystectomy; and Hip fracture surgery (Left, 7/7/2020). Social History:  reports that she has quit smoking. She has never used smokeless tobacco. She reports previous alcohol use. She reports that she does not use drugs. Family History: family history is not on file. The patients home medications have been reviewed. Allergies: Patient has no known allergies.     -------------------------------------------------- RESULTS -------------------------------------------------  Labs:  Results for orders placed or performed during the hospital encounter of 12/23/20   CBC Auto Differential   Result Value Ref Range    WBC 14.4 (H) 4.5 - 11.5 E9/L    RBC 3.85 3.50 - 5.50 E12/L    Hemoglobin 11.9 11.5 - 15.5 g/dL    Hematocrit 34.8 34.0 - 48.0 %    MCV 90.4 80.0 - 99.9 fL    MCH 30.9 26.0 - 35.0 pg    MCHC 34.2 32.0 - 34.5 %    RDW 14.4 11.5 - 15.0 fL    Platelets 577 117 - 889 E9/L    MPV 8.7 7.0 - 12.0 fL    Neutrophils % 80.0 43.0 - 80.0 %    Immature Granulocytes % 0.6 0.0 - 5.0 %    Lymphocytes % 7.0 (L) 20.0 - 42.0 %    Monocytes % 12.1 (H) 2.0 - 12.0 %    Eosinophils % 0.2 0.0 - 6.0 %    Basophils % 0.1 0.0 - 2.0 %    Neutrophils Absolute 11.51 (H) 1.80 - 7.30 E9/L    Immature Granulocytes # 0.09 E9/L    Lymphocytes Absolute 1.00 (L) 1.50 - 4.00 E9/L    Monocytes Absolute 1.74 (H) 0.10 - 0.95 E9/L    Eosinophils Absolute 0.03 (L) 0.05 - 0.50 E9/L    Basophils Absolute 0.01 0.00 - 0.20 E9/L    Poikilocytes 1+     Ovalocytes 1+    Basic Metabolic Panel   Result Value Ref Range    Sodium 129 (L) 132 - 146 mmol/L    Potassium 3.8 3.5 - 5.0 mmol/L    Chloride 90 (L) 98 - 107 mmol/L    CO2 30 (H) 22 - 29 mmol/L    Anion Gap 9 7 - 16 mmol/L    Glucose 116 (H) 74 - 99 mg/dL    BUN 37 (H) 8 - 23 mg/dL    CREATININE 1.1 (H) 0.5 - 1.0 mg/dL    GFR Non-African American 46 >=60 mL/min/1.73    GFR African American 55     Calcium 9.1 8.6 - 10.2 mg/dL   URINALYSIS   Result Value Ref Range    Color, UA Yellow Straw/Yellow    Clarity, UA Clear Clear    Glucose, Ur Negative Negative mg/dL    Bilirubin Urine Negative Negative    Ketones, Urine Negative Negative mg/dL    Specific Gravity, UA 1.010 1.005 - 1.030    Blood, Urine Negative Negative    pH, UA 6.0 5.0 - 9.0    Protein, UA Negative Negative mg/dL    Urobilinogen, Urine 0.2 <2.0 E.U./dL    Nitrite, Urine Negative Negative    Leukocyte Esterase, Urine SMALL (A) Negative   Microscopic Urinalysis   Result Value Ref Range    WBC, UA 1-3 0 - 5 /HPF    RBC, UA 0-1 0 - 2 /HPF    Epithelial Cells, UA RARE /HPF    Bacteria, UA FEW (A) None Seen /HPF       Radiology:  XR CHEST PORTABLE   Final Result   1. Multifocal bilateral patchy pulmonary infiltrates more prominent within   the left lung.          ------------------------- NURSING NOTES AND VITALS REVIEWED ---------------------------  Date / Time Roomed:  12/23/2020 11:27 AM  ED Bed Assignment:  07/07    The nursing notes within the ED encounter and vital signs as below have been reviewed. BP (!) 154/72   Pulse 60   Temp 98.6 °F (37 °C) (Oral)   Resp 18   Ht 5' 4\" (1.626 m)   Wt 139 lb (63 kg)   SpO2 99%   BMI 23.86 kg/m²   Oxygen Saturation Interpretation: Normal      ------------------------------------------ PROGRESS NOTES ------------------------------------------  I have spoken with the patient and discussed todays results, in addition to providing specific details for the plan of care and counseling regarding the diagnosis and prognosis. Their questions are answered at this time and they are agreeable with the plan. I discussed at length with them reasons for immediate return here for re evaluation. They will followup with the on call primary care physician by calling their office tomorrow.       --------------------------------- ADDITIONAL PROVIDER NOTES ---------------------------------  At this time the patient is without objective evidence of an acute process requiring hospitalization or inpatient management. They have remained hemodynamically stable throughout their entire ED visit and are stable for discharge with outpatient follow-up. The plan has been discussed in detail and they are aware of the specific conditions for emergent return, as well as the importance of follow-up. New Prescriptions    BENZONATATE (TESSALON PERLES) 100 MG CAPSULE    Take 1 capsule by mouth 3 times daily as needed for Cough    CEFDINIR (OMNICEF) 300 MG CAPSULE    Take 1 capsule by mouth daily for 10 days    DOXYCYCLINE HYCLATE (VIBRA-TABS) 100 MG TABLET    Take 1 tablet by mouth 2 times daily for 10 days       Diagnosis:  1. COVID-19    2. Leukocytosis, unspecified type        Disposition:  Patient's disposition: Discharge to home  Patient's condition is stable.            Linda Thomas DO  12/23/20 7314

## 2020-12-23 NOTE — ED NOTES
Discharge instructions reviewed. Dr Pastor Better okay to d/c home at this time. Pt to wait in lobby for ride to arrive.      Jessica Jacobo RN  12/23/20 8401

## 2021-01-27 ENCOUNTER — TELEPHONE (OUTPATIENT)
Dept: CARDIOLOGY CLINIC | Age: 86
End: 2021-01-27

## 2021-01-27 NOTE — TELEPHONE ENCOUNTER
Patient's son Joie Yee) notified of Dr. Dominick Jacinto recommendation. Appt scheduled. Marla Christian notified to send new patient paperwork.

## 2021-01-27 NOTE — TELEPHONE ENCOUNTER
Received a referral from Dr. Angelo Gutiérrez to see patient for severe multi-valve disease and CHF. Records scanned. Please review and advise.

## 2021-02-08 ENCOUNTER — OFFICE VISIT (OUTPATIENT)
Dept: CARDIOLOGY CLINIC | Age: 86
End: 2021-02-08
Payer: MEDICARE

## 2021-02-08 VITALS
BODY MASS INDEX: 21.51 KG/M2 | HEART RATE: 66 BPM | HEIGHT: 64 IN | DIASTOLIC BLOOD PRESSURE: 42 MMHG | RESPIRATION RATE: 18 BRPM | WEIGHT: 126 LBS | SYSTOLIC BLOOD PRESSURE: 106 MMHG

## 2021-02-08 DIAGNOSIS — I10 ESSENTIAL HYPERTENSION: ICD-10-CM

## 2021-02-08 DIAGNOSIS — R91.8 LUNG MASS: ICD-10-CM

## 2021-02-08 DIAGNOSIS — I34.0 NONRHEUMATIC MITRAL VALVE REGURGITATION: ICD-10-CM

## 2021-02-08 DIAGNOSIS — I35.1 NONRHEUMATIC AORTIC VALVE INSUFFICIENCY: Primary | ICD-10-CM

## 2021-02-08 DIAGNOSIS — I27.20 PULMONARY HTN (HCC): ICD-10-CM

## 2021-02-08 PROCEDURE — 99214 OFFICE O/P EST MOD 30 MIN: CPT | Performed by: INTERNAL MEDICINE

## 2021-02-08 PROCEDURE — G8420 CALC BMI NORM PARAMETERS: HCPCS | Performed by: INTERNAL MEDICINE

## 2021-02-08 PROCEDURE — 1123F ACP DISCUSS/DSCN MKR DOCD: CPT | Performed by: INTERNAL MEDICINE

## 2021-02-08 PROCEDURE — G8428 CUR MEDS NOT DOCUMENT: HCPCS | Performed by: INTERNAL MEDICINE

## 2021-02-08 PROCEDURE — 93000 ELECTROCARDIOGRAM COMPLETE: CPT | Performed by: INTERNAL MEDICINE

## 2021-02-08 PROCEDURE — 4040F PNEUMOC VAC/ADMIN/RCVD: CPT | Performed by: INTERNAL MEDICINE

## 2021-02-08 PROCEDURE — 1090F PRES/ABSN URINE INCON ASSESS: CPT | Performed by: INTERNAL MEDICINE

## 2021-02-08 PROCEDURE — 1036F TOBACCO NON-USER: CPT | Performed by: INTERNAL MEDICINE

## 2021-02-08 PROCEDURE — G8484 FLU IMMUNIZE NO ADMIN: HCPCS | Performed by: INTERNAL MEDICINE

## 2021-02-08 RX ORDER — FERROUS SULFATE 325(65) MG
325 TABLET ORAL 2 TIMES DAILY
COMMUNITY
Start: 2020-12-21

## 2021-02-08 RX ORDER — FUROSEMIDE 20 MG/1
20 TABLET ORAL DAILY
COMMUNITY
Start: 2021-01-18

## 2021-02-08 NOTE — PROGRESS NOTES
OUTPATIENT CARDIOLOGY FOLLOW-UP    Name: Alyce Alejandro    Age: 80 y.o. Date of Service: 2/8/2021    Chief Complaint: Follow-up for valvular heart disease    Interim History:  She denies any new cardiac complaints since inpatient cardiology evaluation (+decreased functional capacity). History of mechanical fall / left hip fracture s/p surgery on 7/7/2020. She denies recent chest pain, respiratory distress, or palpitations. SR on EKG. History of COVID-19 infection in 11/2020. Review of Systems:   Cardiac: As per HPI  General: No fever, chills  Pulmonary: As per HPI  HEENT: No visual disturbances, difficult swallowing  GI: No nausea, vomiting  : No dysuria, hematuria  Endocrine: No thyroid disease or DM  Musculoskeletal: ORDONEZ x 4, +history of left hip fracture s/p surgery in 7/2020  Skin: Intact, no rashes  Neuro: No headache, seizures  Psych: Currently with no depression, anxiety    Problem List:  Patient Active Problem List   Diagnosis    Closed left hip fracture, initial encounter ([address]    HTN (hypertension)    CAD (coronary artery disease)       Allergies:  No Known Allergies    Current Medications:  Current Outpatient Medications   Medication Sig Dispense Refill    aspirin 81 MG EC tablet Take 2 tablets by mouth daily 60 tablet 0    sennosides-docusate sodium (SENOKOT-S) 8.6-50 MG tablet Take 1 tablet by mouth 2 times daily 60 tablet 0    citalopram (CELEXA) 20 MG tablet Take 20 mg by mouth nightly       amLODIPine (NORVASC) 5 MG tablet Take 5 mg by mouth nightly       enalapril (VASOTEC) 10 MG tablet Take 10 mg by mouth daily      carvedilol (COREG) 25 MG tablet Take 25 mg by mouth 2 times daily (with meals)      cloNIDine (CATAPRES) 0.2 MG tablet Take 0.2 mg by mouth 2 times daily      Cholecalciferol (VITAMIN D3) 1.25 MG (21843 UT) CAPS Take by mouth       No current facility-administered medications for this visit.       Physical Exam:  BP (!) 106/42   Pulse 66   Resp 18   Ht 5' 4\" (1.626 m)   Wt 126 lb (57.2 kg)   BMI 21.63 kg/m²   Wt Readings from Last 3 Encounters:   01/12/21 125 lb (56.7 kg)   12/23/20 139 lb (63 kg)   07/07/20 136 lb 4.8 oz (61.8 kg)     Appearance: Awake, alert, no acute respiratory distress  Skin: Intact, no rash  Head: Normocephalic, atraumatic  Eyes: EOMI, no conjunctival erythema  ENMT: No pharyngeal erythema, MMM, no rhinorrhea  Neck: Supple, no carotid bruits  Lungs: Clear to auscultation bilaterally. No wheezes, rales, or rhonchi. Cardiac: Regular rate and rhythm, 2/6 ANDRA > RUSB  Abdomen: Soft, nontender, +bowel sounds  Extremities: ORDONEZ x 4, no lower extremity edema  Neurologic: Alert, normal mood and affect    Intake/Output:  No intake or output data in the 24 hours ending 02/08/21 0659  No intake/output data recorded. Laboratory Tests:  Lab Results   Component Value Date    CREATININE 1.1 (H) 12/23/2020    BUN 37 (H) 12/23/2020     (L) 12/23/2020    K 3.8 12/23/2020    CL 90 (L) 12/23/2020    CO2 30 (H) 12/23/2020     Lab Results   Component Value Date    MG 2.2 07/07/2020     Lab Results   Component Value Date    ALT 29 07/07/2020    AST 31 07/07/2020    ALKPHOS 74 07/07/2020    BILITOT 0.7 07/07/2020     Lab Results   Component Value Date    WBC 14.4 (H) 12/23/2020    HGB 11.9 12/23/2020    HCT 34.8 12/23/2020    MCV 90.4 12/23/2020     12/23/2020     Lab Results   Component Value Date    INR 1.2 07/06/2020    PROTIME 12.9 (H) 07/06/2020     Cardiac Tests:  EKG: SR, rate 66, NSSTT changes    Echocardiogram: 7/7/2020 (Dr. Lisa Velazquez)   Normal left ventricular systolic function. Ejection fraction is visually estimated at 65%. Normal right ventricular size and function (TAPSE 1.9 cm). There is doppler evidence of stage II diastolic dysfunction. Mild mitral regurgitation. Mild aortic regurgitation. Mild aortic stenosis. Mild-moderate tricuspid regurgitation. Pulmonary HTN / PASP is estimated at 69 mmHg.     Echocardiogram: 1/12/2021 ( Lylael Pouch -- radiologist)   Normal left ventricular systolic function. Ejection fraction > 70%. Mildly dilated RV / normal RV function  Stage I diastolic dysfunction. Mild-moderate mitral regurgitation. Marked aortic regurgitation. Mild aortic stenosis. Moderate pulmonic regurgitation  Moderate-marked tricuspid regurgitation. ASSESSMENT / PLAN:  1. Left hip fracture / mechanical fall -- s/p surgery on 7/7/2020  2. Mild/moderate valve disease on 7/2020 echocardiogram --> marked AI and TR reported on 1/2021 echocardiogram (study interpreted by a radiologist)  3. Pulmonary HTN  4. Hypertension -- on coreg, amlodipine, and enalapril  5. Carotid artery disease s/p prior CEA  6. 5.7 x 5.7 cm IRINEO lung mass worrisome for neoplasm (1/4/2021 CT chest) -- evaluated by pulmonary on 1/12/2021  7.  COVID-19 infection in 11/2020    - 7/2020 and 1/2021 echocardiogram results reviewed with the patient and her family today (images are not available to review from 1/2021 echocardiogram) --> patient/family not interested in any further work-up  - Pulmonary note reviewed -- patient/family not interested in any further work-up (discussed today)  - Continue current medications    Kamryn Santoyo MD  [facility]) Cardiology

## 2023-06-06 NOTE — ANESTHESIA POSTPROCEDURE EVALUATION
Department of Anesthesiology  Postprocedure Note    Patient: Vilma Ruiz  MRN: [id number]  YOB: 1921  Date of evaluation: 7/7/2020  Time:  8:16 PM     Procedure Summary     Date:  07/07/20 Room / Location:  [facility] / [address] [place] [address]    Anesthesia Start:  8773 Anesthesia Stop:  1808    Procedure:  LEFT HIP OPEN REDUCTION INTERNAL left femoral neck fracture (Left ) Diagnosis:  (LEFT FEMORAL NECK FRACTURE)    Surgeon:  Rizwana Olivera MD Responsible Provider:  Alex Guzman MD    Anesthesia Type:  general ASA Status:  3          Anesthesia Type: general    Janay Phase I: Janay Score: 9    Janay Phase II:      Last vitals: Reviewed and per EMR flowsheets.        Anesthesia Post Evaluation    Patient location during evaluation: PACU  Patient participation: complete - patient participated  Level of consciousness: awake  Airway patency: patent  Nausea & Vomiting: no vomiting and no nausea  Complications: no  Cardiovascular status: hemodynamically stable  Respiratory status: acceptable  Hydration status: stable [FreeTextEntry1] : s/p hospitalization for skin grafting related to complications  hydradenitis superativa\par

## (undated) DEVICE — DOUBLE BASIN SET: Brand: MEDLINE INDUSTRIES, INC.

## (undated) DEVICE — BNDG,ELSTC,MATRIX,STRL,6"X5YD,LF,HOOK&LP: Brand: MEDLINE

## (undated) DEVICE — TUBING, SUCTION, 9/32" X 10', STRAIGHT: Brand: MEDLINE

## (undated) DEVICE — DRAPE PATIENT ISOL IRRIG POUCH

## (undated) DEVICE — COVER HNDL LT DISP

## (undated) DEVICE — SYRINGE 20ML LL S/C 50

## (undated) DEVICE — TAPE ADH CLTH SILK H2O REPELLENT CURAD

## (undated) DEVICE — Device

## (undated) DEVICE — DRAPE C ARM W41XL74IN UNIV MOB W RUBBERBAND CLP

## (undated) DEVICE — NEEDLE HYPO 22GA L1.5IN BLK POLYPR HUB S STL REG BVL STR

## (undated) DEVICE — ELECTRODE PT RET AD L9FT HI MOIST COND ADH HYDRGEL CORDED

## (undated) DEVICE — PACK PROCEDURE SURG GEN CUST

## (undated) DEVICE — INTENDED FOR TISSUE SEPARATION, AND OTHER PROCEDURES THAT REQUIRE A SHARP SURGICAL BLADE TO PUNCTURE OR CUT.: Brand: BARD-PARKER ® STAINLESS STEEL BLADES

## (undated) DEVICE — STRIP,CLOSURE,WOUND,MEDI-STRIP,1/2X4: Brand: MEDLINE

## (undated) DEVICE — TAPE ADH W3INXL10YD WHT COT WVN BK POWERFUL RUB BASE HIGHLY

## (undated) DEVICE — SPONGE LAP W18XL18IN WHT COT 4 PLY FLD STRUNG RADPQ DISP ST

## (undated) DEVICE — APPLICATOR PREP 26ML 0.7% IOD POVACRYLEX 74% ISO ALC ST

## (undated) DEVICE — 4-PORT MANIFOLD: Brand: NEPTUNE 2

## (undated) DEVICE — GOWN,SIRUS,POLYRNF,BRTHSLV,XL,30/CS: Brand: MEDLINE

## (undated) DEVICE — DRESSING COMP W4XL4IN N ADH PD W2.5XL2.5IN GZ BORDERED ADH